# Patient Record
Sex: FEMALE | Race: WHITE | Employment: OTHER | URBAN - METROPOLITAN AREA
[De-identification: names, ages, dates, MRNs, and addresses within clinical notes are randomized per-mention and may not be internally consistent; named-entity substitution may affect disease eponyms.]

---

## 2017-02-07 ENCOUNTER — OFFICE VISIT (OUTPATIENT)
Dept: INTERNAL MEDICINE CLINIC | Age: 67
End: 2017-02-07

## 2017-02-07 ENCOUNTER — HOSPITAL ENCOUNTER (OUTPATIENT)
Dept: LAB | Age: 67
Discharge: HOME | End: 2017-02-07
Payer: MEDICARE

## 2017-02-07 VITALS
SYSTOLIC BLOOD PRESSURE: 130 MMHG | HEIGHT: 62 IN | RESPIRATION RATE: 18 BRPM | WEIGHT: 116 LBS | HEART RATE: 77 BPM | DIASTOLIC BLOOD PRESSURE: 59 MMHG | BODY MASS INDEX: 21.35 KG/M2 | TEMPERATURE: 96.3 F | OXYGEN SATURATION: 95 %

## 2017-02-07 DIAGNOSIS — Z12.11 COLON CANCER SCREENING: ICD-10-CM

## 2017-02-07 DIAGNOSIS — M54.2 CHRONIC NECK PAIN: ICD-10-CM

## 2017-02-07 DIAGNOSIS — I10 ESSENTIAL HYPERTENSION: Primary | ICD-10-CM

## 2017-02-07 DIAGNOSIS — G89.29 CHRONIC NECK PAIN: ICD-10-CM

## 2017-02-07 DIAGNOSIS — G89.29 CHRONIC PAIN OF LEFT KNEE: ICD-10-CM

## 2017-02-07 DIAGNOSIS — M25.562 CHRONIC PAIN OF LEFT KNEE: ICD-10-CM

## 2017-02-07 DIAGNOSIS — I87.2 VENOUS INSUFFICIENCY OF BOTH LOWER EXTREMITIES: ICD-10-CM

## 2017-02-07 DIAGNOSIS — Z12.39 BREAST CANCER SCREENING: ICD-10-CM

## 2017-02-07 DIAGNOSIS — Z23 ENCOUNTER FOR IMMUNIZATION: ICD-10-CM

## 2017-02-07 PROCEDURE — 80053 COMPREHEN METABOLIC PANEL: CPT

## 2017-02-07 PROCEDURE — 36415 COLL VENOUS BLD VENIPUNCTURE: CPT

## 2017-02-07 PROCEDURE — 80061 LIPID PANEL: CPT

## 2017-02-07 PROCEDURE — 85027 COMPLETE CBC AUTOMATED: CPT

## 2017-02-07 RX ORDER — HYDROCODONE BITARTRATE AND ACETAMINOPHEN 5; 325 MG/1; MG/1
1 TABLET ORAL
Qty: 30 TAB | Refills: 0 | Status: SHIPPED | OUTPATIENT
Start: 2017-02-07 | End: 2017-03-30 | Stop reason: SDUPTHER

## 2017-02-07 NOTE — PROGRESS NOTES
HISTORY OF PRESENT ILLNESS  Paolo Salvador is a 61 y.o. female. Pt. comes in for f/u. Has multiple medical problems. Reports a recent fall outside and hitting L knee and hip. Has chronic arthritic pains especially in neck and back. Norco helps. Reports some ankle swelling at end of day. No CP or SOB. Reports compliance with medications and diet. Med list and most recent labs/studies reviewed with pt. Has not done labs the last 2 times as recommended  Due to not having insurance. Trying to be active physically as tolerated. Reports chronic anxiety and depression. Meds help. No smoking. Still drinks some alcohol. Due for labs. Needs refills. Reports no other new c/o. Hypertension    Associated symptoms include neck pain. Pertinent negatives include no chest pain, no blurred vision, no headaches, no dizziness and no shortness of breath. Ankle swelling    Associated symptoms include back pain and neck pain. Knee Pain   Pertinent negatives include no chest pain, no abdominal pain, no headaches and no shortness of breath. Follow Up Chronic Condition   Pertinent negatives include no chest pain, no abdominal pain, no headaches and no shortness of breath. Review of Systems   Constitutional: Negative. Eyes: Negative for blurred vision. Respiratory: Negative for shortness of breath. Cardiovascular: Negative for chest pain and leg swelling. Gastrointestinal: Negative for abdominal pain. Genitourinary: Negative for dysuria. Musculoskeletal: Positive for back pain, joint pain and neck pain. Negative for falls. Neurological: Negative for dizziness, sensory change and headaches. Psychiatric/Behavioral: Positive for depression. The patient is nervous/anxious. The patient does not have insomnia. All other systems reviewed and are negative. Physical Exam   Constitutional: She is oriented to person, place, and time. She appears well-developed and well-nourished. No distress.    HENT:   Head: Normocephalic and atraumatic. Mouth/Throat: Oropharynx is clear and moist.   Eyes: Conjunctivae are normal. No scleral icterus. Neck: Normal range of motion. Neck supple. No JVD present. No thyromegaly present. Cardiovascular: Normal rate, regular rhythm, normal heart sounds and intact distal pulses. No murmur heard. Pulmonary/Chest: Effort normal and breath sounds normal. No respiratory distress. She has no wheezes. She has no rales. Abdominal: Soft. Bowel sounds are normal. She exhibits no distension. Musculoskeletal: She exhibits tenderness (cervicls and lumbars, L knee but no bruise/effusion). She exhibits no edema. djd   Neurological: She is alert and oriented to person, place, and time. Coordination normal.   Skin: Skin is warm and dry. No rash noted. Psychiatric: Her behavior is normal.   Chronically depressed/anxious   Nursing note and vitals reviewed. ASSESSMENT and PLAN  Rosalinda Marion was seen today for hypertension, ankle swelling, knee pain and follow up chronic condition. Diagnoses and all orders for this visit:    Essential hypertension  -     CBC W/O DIFF  -     LIPID PANEL  -     METABOLIC PANEL, COMPREHENSIVE    Venous insufficiency of both lower extremities    Chronic neck pain    Chronic pain of left knee    Encounter for immunization  -     Influenza virus vaccine (PF SYRINGE) trivalent, split, 3 years and older (ALL BRANDS)  -     Pneumococcal conjugate 13 valent, IM (PREVNAR-13)    Breast cancer screening  -     CLYDE MAMMO BI SCREENING INCL CAD; Future    Colon cancer screening  -     REFERRAL FOR COLONOSCOPY    Other orders  -     Cancel: Influenza virus vaccine (PF SYRINGE) trivalent, split, 3 years and older (ALL BRANDS)  -     HYDROcodone-acetaminophen (NORCO) 5-325 mg per tablet; Take 1 Tab by mouth nightly as needed for Pain.  Max Daily Amount: 1 Tab.  -     varicella zoster vacine live (ZOSTAVAX) 19,400 unit/0.65 mL susr injection; 1 Vial by SubCUTAneous route once for 1 dose.      Follow-up Disposition:  Return in about 6 months (around 8/7/2017).    lab results and schedule of future lab studies reviewed with patient  reviewed diet, exercise and weight control  reviewed medications and side effects in detail  F/u with other MD's as scheduled  Overall stable

## 2017-02-07 NOTE — LETTER
2/15/2017 12:18 PM 
 
Ms. Lavonne Chaudhry [address] [facility] [id number] Dear Lavonne Weaver: 
 
Please find your most recent results below. Resulted Orders CBC W/O DIFF Result Value Ref Range WBC 6.9 3.4 - 10.8 x10E3/uL  
 RBC 3.70 (L) 3.77 - 5.28 x10E6/uL HGB 12.5 11.1 - 15.9 g/dL HCT 36.8 34.0 - 46.6 %  (H) 79 - 97 fL  
 MCH 33.8 (H) 26.6 - 33.0 pg  
 MCHC 34.0 31.5 - 35.7 g/dL  
 RDW 12.8 12.3 - 15.4 % PLATELET 534 965 - 298 x10E3/uL Narrative Performed at:  [address]  [id number] : David Lockett MD, Phone:  [phone] LIPID PANEL Result Value Ref Range Cholesterol, total 236 (H) 100 - 199 mg/dL Triglyceride 74 0 - 149 mg/dL HDL Cholesterol 120 >39 mg/dL VLDL, calculated 15 5 - 40 mg/dL LDL, calculated 101 (H) 0 - 99 mg/dL Narrative Performed at:  [address]  [id number] : David Lockett MD, Phone:  [phone] METABOLIC PANEL, COMPREHENSIVE Result Value Ref Range Glucose 101 (H) 65 - 99 mg/dL BUN 18 8 - 27 mg/dL Creatinine 0.94 0.57 - 1.00 mg/dL GFR est non-AA 64 >59 mL/min/1.73 GFR est AA 74 >59 mL/min/1.73  
 BUN/Creatinine ratio 19 11 - 26 Sodium 134 134 - 144 mmol/L Potassium 4.8 3.5 - 5.2 mmol/L Chloride 92 (L) 96 - 106 mmol/L  
 CO2 20 18 - 29 mmol/L Calcium 10.4 (H) 8.7 - 10.3 mg/dL Protein, total 7.4 6.0 - 8.5 g/dL Albumin 4.9 (H) 3.6 - 4.8 g/dL GLOBULIN, TOTAL 2.5 1.5 - 4.5 g/dL A-G Ratio 2.0 1.1 - 2.5 Bilirubin, total 0.8 0.0 - 1.2 mg/dL Alk. phosphatase 83 39 - 117 IU/L  
 AST (SGOT) 33 0 - 40 IU/L  
 ALT (SGPT) 19 0 - 32 IU/L Narrative Performed at:  [address]  [id number] : David Lockett MD, Phone:  [phone] CVD REPORT Result Value Ref Range INTERPRETATION Note Comment:  
   Supplement report is available. Narrative Performed at:  [address] [place] [address]  [id number] : Keli Murray PhD, Phone:  [phone] RECOMMENDATIONS: 
 
  stable Please call me if you have any questions: [phone] Sincerely, 
 
 
Jaron Cowart, DO

## 2017-02-08 LAB
ALBUMIN SERPL-MCNC: 4.9 G/DL (ref 3.6–4.8)
ALBUMIN/GLOB SERPL: 2 {RATIO} (ref 1.1–2.5)
ALP SERPL-CCNC: 83 IU/L (ref 39–117)
ALT SERPL-CCNC: 19 IU/L (ref 0–32)
AST SERPL-CCNC: 33 IU/L (ref 0–40)
BILIRUB SERPL-MCNC: 0.8 MG/DL (ref 0–1.2)
BUN SERPL-MCNC: 18 MG/DL (ref 8–27)
BUN/CREAT SERPL: 19 (ref 11–26)
CALCIUM SERPL-MCNC: 10.4 MG/DL (ref 8.7–10.3)
CHLORIDE SERPL-SCNC: 92 MMOL/L (ref 96–106)
CHOLEST SERPL-MCNC: 236 MG/DL (ref 100–199)
CO2 SERPL-SCNC: 20 MMOL/L (ref 18–29)
CREAT SERPL-MCNC: 0.94 MG/DL (ref 0.57–1)
ERYTHROCYTE [DISTWIDTH] IN BLOOD BY AUTOMATED COUNT: 12.8 % (ref 12.3–15.4)
GLOBULIN SER CALC-MCNC: 2.5 G/DL (ref 1.5–4.5)
GLUCOSE SERPL-MCNC: 101 MG/DL (ref 65–99)
HCT VFR BLD AUTO: 36.8 % (ref 34–46.6)
HDLC SERPL-MCNC: 120 MG/DL
HGB BLD-MCNC: 12.5 G/DL (ref 11.1–15.9)
INTERPRETATION, 910389: NORMAL
LDLC SERPL CALC-MCNC: 101 MG/DL (ref 0–99)
MCH RBC QN AUTO: 33.8 PG (ref 26.6–33)
MCHC RBC AUTO-ENTMCNC: 34 G/DL (ref 31.5–35.7)
MCV RBC AUTO: 100 FL (ref 79–97)
PLATELET # BLD AUTO: 200 X10E3/UL (ref 150–379)
POTASSIUM SERPL-SCNC: 4.8 MMOL/L (ref 3.5–5.2)
PROT SERPL-MCNC: 7.4 G/DL (ref 6–8.5)
RBC # BLD AUTO: 3.7 X10E6/UL (ref 3.77–5.28)
SODIUM SERPL-SCNC: 134 MMOL/L (ref 134–144)
TRIGL SERPL-MCNC: 74 MG/DL (ref 0–149)
VLDLC SERPL CALC-MCNC: 15 MG/DL (ref 5–40)
WBC # BLD AUTO: 6.9 X10E3/UL (ref 3.4–10.8)

## 2017-03-31 RX ORDER — HYDROCODONE BITARTRATE AND ACETAMINOPHEN 5; 325 MG/1; MG/1
1 TABLET ORAL
Qty: 30 TAB | Refills: 0 | Status: SHIPPED | OUTPATIENT
Start: 2017-03-31 | End: 2017-08-08 | Stop reason: SDUPTHER

## 2017-04-07 DIAGNOSIS — Z12.39 BREAST CANCER SCREENING: ICD-10-CM

## 2017-04-07 RX ORDER — AMLODIPINE BESYLATE 5 MG/1
5 TABLET ORAL DAILY
Qty: 90 TAB | Refills: 3 | Status: SHIPPED | OUTPATIENT
Start: 2017-04-07 | End: 2018-02-14 | Stop reason: SDUPTHER

## 2017-04-07 RX ORDER — CLONIDINE HYDROCHLORIDE 0.1 MG/1
0.1 TABLET ORAL 2 TIMES DAILY
Qty: 180 TAB | Refills: 3 | Status: SHIPPED | OUTPATIENT
Start: 2017-04-07 | End: 2018-02-14 | Stop reason: SDUPTHER

## 2017-04-07 RX ORDER — METOPROLOL TARTRATE 50 MG/1
50 TABLET ORAL 2 TIMES DAILY
Qty: 180 TAB | Refills: 3 | Status: SHIPPED | OUTPATIENT
Start: 2017-04-07 | End: 2018-02-14 | Stop reason: SDUPTHER

## 2017-04-18 ENCOUNTER — HOSPITAL ENCOUNTER (OUTPATIENT)
Dept: GENERAL RADIOLOGY | Age: 67
Discharge: HOME | End: 2017-04-18
Payer: MEDICARE
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
## 2017-04-18 PROCEDURE — 73502 X-RAY EXAM HIP UNI 2-3 VIEWS: CPT

## 2017-04-18 PROCEDURE — 73564 X-RAY EXAM KNEE 4 OR MORE: CPT

## 2017-07-20 ENCOUNTER — TELEPHONE (OUTPATIENT)
Dept: INTERNAL MEDICINE CLINIC | Age: 67
End: 2017-07-20

## 2017-07-20 DIAGNOSIS — M21.619 BUNION: Primary | ICD-10-CM

## 2017-07-20 DIAGNOSIS — M20.42 HAMMER TOE OF LEFT FOOT: Primary | ICD-10-CM

## 2017-07-20 DIAGNOSIS — M21.612 BUNION, LEFT FOOT: ICD-10-CM

## 2017-07-20 NOTE — TELEPHONE ENCOUNTER
Wants a referral to podiatrist Dr. Nahid Lawson. Appointment date is Aug 3. Phone [phone], fax is [phone]. Hammer toe, bunion.  If a referral is written, get with Maranda Arroyo to do the insurance referral.

## 2017-07-20 NOTE — TELEPHONE ENCOUNTER
Spoke to patient she reports hammer toe left foot and bunion, left foot.  patiendt is requesting referral to Podiatrist.

## 2017-08-08 ENCOUNTER — OFFICE VISIT (OUTPATIENT)
Dept: INTERNAL MEDICINE CLINIC | Age: 67
End: 2017-08-08

## 2017-08-08 VITALS
TEMPERATURE: 96.2 F | BODY MASS INDEX: 21.16 KG/M2 | WEIGHT: 115 LBS | HEIGHT: 62 IN | HEART RATE: 65 BPM | SYSTOLIC BLOOD PRESSURE: 143 MMHG | DIASTOLIC BLOOD PRESSURE: 75 MMHG | OXYGEN SATURATION: 100 % | RESPIRATION RATE: 18 BRPM

## 2017-08-08 DIAGNOSIS — Z13.39 SCREENING FOR ALCOHOLISM: ICD-10-CM

## 2017-08-08 DIAGNOSIS — M79.672 LEFT FOOT PAIN: ICD-10-CM

## 2017-08-08 DIAGNOSIS — Z13.31 SCREENING FOR DEPRESSION: ICD-10-CM

## 2017-08-08 DIAGNOSIS — M54.2 CHRONIC NECK PAIN: ICD-10-CM

## 2017-08-08 DIAGNOSIS — M54.42 CHRONIC LEFT-SIDED LOW BACK PAIN WITH LEFT-SIDED SCIATICA: ICD-10-CM

## 2017-08-08 DIAGNOSIS — Z00.00 WELCOME TO MEDICARE PREVENTIVE VISIT: Primary | ICD-10-CM

## 2017-08-08 DIAGNOSIS — G89.29 CHRONIC LEFT-SIDED LOW BACK PAIN WITH LEFT-SIDED SCIATICA: ICD-10-CM

## 2017-08-08 DIAGNOSIS — Z12.31 ENCOUNTER FOR SCREENING MAMMOGRAM FOR MALIGNANT NEOPLASM OF BREAST: ICD-10-CM

## 2017-08-08 DIAGNOSIS — Z78.0 POSTMENOPAUSAL: ICD-10-CM

## 2017-08-08 DIAGNOSIS — I10 ESSENTIAL HYPERTENSION: ICD-10-CM

## 2017-08-08 DIAGNOSIS — Z00.00 ROUTINE GENERAL MEDICAL EXAMINATION AT A HEALTH CARE FACILITY: ICD-10-CM

## 2017-08-08 DIAGNOSIS — L98.9 SKIN LESION OF BACK: ICD-10-CM

## 2017-08-08 DIAGNOSIS — G89.29 CHRONIC NECK PAIN: ICD-10-CM

## 2017-08-08 RX ORDER — HYDROCODONE BITARTRATE AND ACETAMINOPHEN 5; 325 MG/1; MG/1
1 TABLET ORAL
Qty: 30 TAB | Refills: 0 | Status: SHIPPED | OUTPATIENT
Start: 2017-08-08 | End: 2017-08-08 | Stop reason: SDUPTHER

## 2017-08-08 RX ORDER — HYDROCODONE BITARTRATE AND ACETAMINOPHEN 5; 325 MG/1; MG/1
1 TABLET ORAL
Qty: 30 TAB | Refills: 0 | Status: SHIPPED | OUTPATIENT
Start: 2017-08-08 | End: 2018-02-13 | Stop reason: SDUPTHER

## 2017-08-08 NOTE — PROGRESS NOTES
History     Past Medical History:   Diagnosis Date    Arthritis     back problems    Depression     Family history of skin cancer     father Lloyd Alva from finger    Hypertension 2015    Tachycardia 2015    Tanning bed exposure     past      Past Surgical History:   Procedure Laterality Date    HX BUNIONECTOMY      HX GYN      HX MALIGNANT SKIN LESION EXCISION      basal cell carcinoma     Current Outpatient Prescriptions   Medication Sig Dispense Refill    amLODIPine (NORVASC) 5 mg tablet Take 1 Tab by mouth daily. 90 Tab 3    cloNIDine HCl (CATAPRES) 0.1 mg tablet Take 1 Tab by mouth two (2) times a day. 180 Tab 3    metoprolol tartrate (LOPRESSOR) 50 mg tablet Take 1 Tab by mouth two (2) times a day. 180 Tab 3    HYDROcodone-acetaminophen (NORCO) 5-325 mg per tablet Take 1 Tab by mouth nightly as needed for Pain. Max Daily Amount: 1 Tab. 30 Tab 0    ibuprofen 100 mg tablet Take 100 mg by mouth every six (6) hours as needed for Pain.  celecoxib (CELEBREX) 200 mg capsule Take  by mouth two (2) times a day.  methocarbamol (ROBAXIN) 750 mg tablet Take  by mouth three (3) times daily as needed.  Cetirizine 10 mg cap Take  by mouth.        Allergies   Allergen Reactions    Erythromycin Other (comments)     Intestinal problems       Family History   Problem Relation Age of Onset    Bleeding Prob Father     Cancer Father      melanoma    Bleeding Prob Brother      Social History   Substance Use Topics    Smoking status: Never Smoker    Smokeless tobacco: Never Used    Alcohol use 0.0 oz/week     7 - 10 Glasses of wine per week      Comment: 3 or 4 drinks white wine per week     Diet, Lifestyle: generally follows a low sodium diet    Exercise level: moderately active    Depression Risk Screen     PHQ over the last two weeks 8/8/2017   Little interest or pleasure in doing things Not at all   Feeling down, depressed or hopeless Not at all   Total Score PHQ 2 0     Alcohol Risk Screen On any occasion during the past 3 months, have you had more than 3 drinks containing alcohol? No    Do you average more than 7 drinks per week? No      Functional Ability and Level of Safety     Hearing Loss   none    Activities of Daily Living   Self-care     Fall Risk Screen   Fall Risk Assessment, last 12 mths 8/8/2017   Able to walk? Yes   Fall in past 12 months? Yes   Fall with injury? No   Number of falls in past 12 months 1   Fall Risk Score 1     Abuse Screen   None  Patient is not abused    Review of Systems   Pertinent items are noted in HPI. Physical Examination     No exam data present     Visit Vitals    /75 (BP 1 Location: Right arm, BP Patient Position: Sitting)    Pulse 65    Temp 96.2 °F (35.7 °C) (Oral)    Resp 18    Ht 5' 1.5\" (1.562 m)    Wt 115 lb (52.2 kg)    SpO2 100%    BMI 21.38 kg/m2     General:  Alert, cooperative, no distress, appears stated age. Head:  Normocephalic, without obvious abnormality, atraumatic. Eyes:  Conjunctivae/corneas clear. PERRL, EOMs intact. Fundi benign. Ears:  Normal TMs and external ear canals both ears. Nose: Nares normal. Septum midline. Mucosa normal. No drainage or sinus tenderness. Throat: Lips, mucosa, and tongue normal. Teeth and gums normal.   Neck: Supple, symmetrical, trachea midline, no adenopathy, thyroid: no enlargement/tenderness/nodules, no carotid bruit and no JVD. Back:   Symmetric, no curvature. ROM normal. No CVA tenderness. Lungs:   Clear to auscultation bilaterally. Chest wall:  No tenderness or deformity. Heart:  Regular rate and rhythm, S1, S2 normal, no murmur, click, rub or gallop. Breast Exam:  No tenderness, masses, or nipple abnormality. Abdomen:   Soft, non-tender. Bowel sounds normal. No masses,  No organomegaly. Genitalia:  Normal female without lesion, discharge or tenderness. Rectal:  Normal tone,  no masses or tenderness  Guaiac negative stool.    Extremities: Extremities normal, atraumatic, no cyanosis or edema. Pulses: 2+ and symmetric all extremities. Skin: Skin color, texture, turgor normal. No rashes or lesions. Lymph nodes: Cervical, supraclavicular, and axillary nodes normal.   Neurologic: CNII-XII intact. Normal strength, sensation and reflexes throughout. EKG Screening: normal EKG, normal sinus rhythm, unchanged from previous tracings. Patient Care Team:  Ashish Reich DO as PCP - [address] [place] [address] Dr Ramos, NP as Nurse Practitioner (Nurse Practitioner)     End-of-life planning  Advanced Directive discussed and documented: YES      Assessment/Plan   Education and counseling provided:  Are appropriate based on today's review and evaluation    Follow-up Disposition: Not on File.

## 2017-08-08 NOTE — PROGRESS NOTES
HISTORY OF PRESENT ILLNESS  Jacinta Webb is a 74 y.o. female. Pt. comes in for f/u as well as welcome to Medicare visit. Has multiple medical problems. Has chronic arthritic pains especially in neck and back. Takes Celebrex twice daily. Has been taking extra ibuprofen as well despite my recommendation not to. PRN Norco helps. Also followed by orthopedic MD. Reports a chronic lesion on left upper back. Has history of skin cancer. Has appointment with dermatologist coming up. Reports compliance with medications and diet. Med list and most recent labs/studies reviewed with pt. They all looks stable. Followed by podiatry for left foot pain with bunion and joey. Plans for surgery. Supposed to get preop labs. Her depression anxiety is stable with medications. Trying to be active physically as tolerated. No smoking. Still drinks 5-92 alcoholic drinks weekly. Needs refills. Reports no other new c/o. Blood Pressure Check   Pertinent negatives include no chest pain, no abdominal pain, no headaches and no shortness of breath. Foot Pain   Pertinent negatives include no chest pain, no abdominal pain, no headaches and no shortness of breath. Welcome To Medicare   Pertinent negatives include no chest pain, no abdominal pain, no headaches and no shortness of breath. Skin Problem   Pertinent negatives include no chest pain, no abdominal pain, no headaches and no shortness of breath. Review of Systems   Constitutional: Negative. Eyes: Negative for blurred vision. Respiratory: Negative for shortness of breath. Cardiovascular: Negative for chest pain and leg swelling. Gastrointestinal: Negative for abdominal pain. Genitourinary: Negative for dysuria. Musculoskeletal: Positive for back pain, joint pain and neck pain. Negative for falls. Neurological: Negative for dizziness, sensory change and headaches. Psychiatric/Behavioral: Positive for depression.  The patient is nervous/anxious. The patient does not have insomnia. All other systems reviewed and are negative. Physical Exam   Constitutional: She is oriented to person, place, and time. She appears well-developed and well-nourished. No distress. HENT:   Head: Normocephalic and atraumatic. Mouth/Throat: Oropharynx is clear and moist.   Eyes: Conjunctivae are normal. No scleral icterus. Neck: Normal range of motion. Neck supple. No JVD present. No thyromegaly present. Cardiovascular: Normal rate, regular rhythm, normal heart sounds and intact distal pulses. No murmur heard. Pulmonary/Chest: Effort normal and breath sounds normal. No respiratory distress. She has no wheezes. She has no rales. Abdominal: Soft. Bowel sounds are normal. She exhibits no distension. Musculoskeletal: She exhibits tenderness (cervicls and lumbar, chronic). She exhibits no edema. djd  Left bunion and hammertoes   Neurological: She is alert and oriented to person, place, and time. Coordination normal.   Skin: Skin is warm and dry. No rash noted. Round raised lesion on left trapezius muscle suspicious for skin cancer   Psychiatric: Her behavior is normal.   Chronically depressed/anxious   Nursing note and vitals reviewed. ASSESSMENT and PLAN  Diagnoses and all orders for this visit:    1. Welcome to Medicare preventive visit  -     AMB POC EKG ROUTINE W/ 12 LEADS, INTER & REP    2. Routine general medical examination at a health care facility  -     AMB POC EKG ROUTINE W/ 12 LEADS, INTER & REP    3. Screening for alcoholism    4. Screening for depression  -     Depression Screen Annual    5. Encounter for screening mammogram for malignant neoplasm of breast  -     Bilateral Digital Screening Mammography; Future    6. Postmenopausal  -     Dexa Bone Density Study Axial ([id number]); Future    7. Essential hypertension    8. Skin lesion of back    9. Left foot pain    10. Chronic neck pain    11.  Chronic left-sided low back pain with left-sided sciatica    Other orders  -     HYDROcodone-acetaminophen (NORCO) 5-325 mg per tablet; Take 1 Tab by mouth nightly as needed for Pain. Max Daily Amount: 1 Tab.  -     varicella zoster vacine live (ZOSTAVAX) 19,400 unit/0.65 mL susr injection; 1 Vial by SubCUTAneous route once for 1 dose. Follow-up Disposition:  Return in about 6 months (around 2/8/2018).    lab results and schedule of future lab studies reviewed with patient  reviewed diet, exercise and weight control  reviewed medications and side effects in detail  F/u with other MD's as scheduled  Overall stable

## 2017-08-08 NOTE — MR AVS SNAPSHOT
Visit Information Date & Time Provider Department Dept. Phone Encounter #  
 8/8/2017 11:00 AM [facility], [facility] Internal Medicine [phone] [id number] Follow-up Instructions Return in about 6 months (around 2/8/2018). Upcoming Health Maintenance Date Due Hepatitis C Screening 1950 COLONOSCOPY 2/25/1970 BREAST CANCER SCRN MAMMOGRAM 2/25/2002 ZOSTER VACCINE AGE 60> 12/25/2011 DTaP/Tdap/Td series (1 - Tdap) 2/3/2013 GLAUCOMA SCREENING Q2Y 2/25/2017 OSTEOPOROSIS SCREENING (DEXA) 2/25/2017 MEDICARE YEARLY EXAM 2/25/2017 INFLUENZA AGE 9 TO ADULT 8/1/2017 Pneumococcal 65+ Low/Medium Risk (2 of 2 - PPSV23) 2/2/2021 Allergies as of 8/8/2017  Review Complete On: 8/8/2017 By: [facility] DO Severity Noted Reaction Type Reactions Erythromycin  05/20/2014    Other (comments) Intestinal problems Current Immunizations  Reviewed on 2/7/2017 Name Date Influenza Vaccine 12/5/2014 Influenza Vaccine Intradermal PF 2/2/2016 Influenza Vaccine PF 2/7/2017 Pneumococcal Conjugate (PCV-13) 2/7/2017 Pneumococcal Polysaccharide (PPSV-23) 2/2/2016 Td 2/2/2013 Not reviewed this visit You Were Diagnosed With   
  
 Codes Comments Welcome to Medicare preventive visit    -  Primary ICD-10-CM: Z00.00 ICD-9-CM: V70.0 Routine general medical examination at a health care facility     ICD-10-CM: Z00.00 ICD-9-CM: V70.0 Screening for alcoholism     ICD-10-CM: Z13.89 ICD-9-CM: V79.1 Screening for depression     ICD-10-CM: Z13.89 ICD-9-CM: V79.0 Encounter for screening mammogram for malignant neoplasm of breast     ICD-10-CM: Z12.31 
ICD-9-CM: V76.12 Postmenopausal     ICD-10-CM: Z78.0 ICD-9-CM: V49.81 Essential hypertension     ICD-10-CM: I10 
ICD-9-CM: 401.9 Skin lesion of back     ICD-10-CM: L98.9 ICD-9-CM: 709.9 Left foot pain     ICD-10-CM: U05.503 ICD-9-CM: 729.5 Chronic neck pain     ICD-10-CM: M54.2, G89.29 ICD-9-CM: 723.1, 338.29 Chronic left-sided low back pain with left-sided sciatica     ICD-10-CM: M54.42, G89.29 ICD-9-CM: 724.2, 724.3, 338.29 Vitals BP Pulse Temp Resp Height(growth percentile) Weight(growth percentile) 143/75 (BP 1 Location: Right arm, BP Patient Position: Sitting) 65 96.2 °F (35.7 °C) (Oral) 18 5' 1.5\" (1.562 m) 115 lb (52.2 kg) SpO2 BMI OB Status Smoking Status 100% 21.38 kg/m2 Postmenopausal Never Smoker Vitals History BMI and BSA Data Body Mass Index Body Surface Area  
 21.38 kg/m 2 1.51 m 2 [facility] [phone] Your Updated Medication List  
  
   
This list is accurate as of: 8/8/17 12:05 PM.  Always use your most recent med list. amLODIPine 5 mg tablet Commonly known as:  Nathanael Ape Take 1 Tab by mouth daily. CeleBREX 200 mg capsule Generic drug:  celecoxib Take  by mouth two (2) times a day. Cetirizine 10 mg Cap Take  by mouth.  
  
 cloNIDine HCl 0.1 mg tablet Commonly known as:  CATAPRES Take 1 Tab by mouth two (2) times a day. HYDROcodone-acetaminophen 5-325 mg per tablet Commonly known as:  [place] Ilia Take 1 Tab by mouth nightly as needed for Pain. Max Daily Amount: 1 Tab.  
  
 ibuprofen 100 mg tablet Take 100 mg by mouth every six (6) hours as needed for Pain. methocarbamol 750 mg tablet Commonly known as:  ROBAXIN Take  by mouth three (3) times daily as needed. metoprolol tartrate 50 mg tablet Commonly known as:  LOPRESSOR Take 1 Tab by mouth two (2) times a day. varicella zoster vacine live 19,400 unit/0.65 mL Susr injection Commonly known as:  ZOSTAVAX  
1 Vial by SubCUTAneous route once for 1 dose. Prescriptions Printed Refills HYDROcodone-acetaminophen (NORCO) 5-325 mg per tablet 0 Sig: Take 1 Tab by mouth nightly as needed for Pain. Max Daily Amount: 1 Tab. Class: Print Route: Oral  
 varicella zoster vacine live (ZOSTAVAX) 19,400 unit/0.65 mL susr injection 0 Si Vial by SubCUTAneous route once for 1 dose. Class: Print Route: SubCUTAneous We Performed the Following AMB POC EKG ROUTINE W/ 12 LEADS, INTER & REP [42919 CPT(R)] [address] [[id number] [facility]] Follow-up Instructions Return in about 6 months (around 2018). To-Do List   
 2017 Imaging:  DEXA BONE DENSITY STUDY AXIAL   
  
 2017 Imaging:  CLYDE MAMMO BI SCREENING INCL CAD Introducing [facility]! Nadja Fofana introduces [employer] patient portal. Now you can access parts of your medical record, email your doctor's office, and request medication refills online. 1. In your internet browser, go to [email or web address] 2. Click on the First Time User? Click Here link in the Sign In box. You will see the New Member Sign Up page. 3. Enter your [employer] Access Code exactly as it appears below. You will not need to use this code after youve completed the sign-up process. If you do not sign up before the expiration date, you must request a new code. · [employer] Access Code: [email or web address] Expires: 2017 11:21 AM 
 
4. Enter the last four digits of your Social Security Number (xxxx) and Date of Birth (mm/dd/yyyy) as indicated and click Submit. You will be taken to the next sign-up page. 5. Create a [employer] ID. This will be your [employer] login ID and cannot be changed, so think of one that is secure and easy to remember. 6. Create a [employer] password. You can change your password at any time. 7. Enter your Password Reset Question and Answer. This can be used at a later time if you forget your password. 8. Enter your e-mail address. You will receive e-mail notification when new information is available in [address] 9. Click Sign Up. You can now view and download portions of your medical record. 10. Click the Download Summary menu link to download a portable copy of your medical information. If you have questions, please visit the Frequently Asked Questions section of the [employer] website. Remember, [employer] is NOT to be used for urgent needs. For medical emergencies, dial 911. Now available from your iPhone and Android! Please provide this summary of care documentation to your next provider. Your primary care clinician is listed as Jacqlyn Areas. If you have any questions after today's visit, please call [phone].

## 2017-08-08 NOTE — PROGRESS NOTES
Chief Complaint   Patient presents with    Blood Pressure Check    Foot Pain     l, SEES DPM, plans for surgery    Annual Wellness Visit    Skin Problem     L upper back lesion, , [address] Maintenance Due   Topic Date Due    Hepatitis C Screening  1950    COLONOSCOPY  02/25/1970    BREAST CANCER SCRN MAMMOGRAM  02/25/2002    ZOSTER VACCINE AGE 60>  12/25/2011    DTaP/Tdap/Td series (1 - Tdap) 02/03/2013    GLAUCOMA SCREENING Q2Y  02/25/2017    OSTEOPOROSIS SCREENING (DEXA)  02/25/2017    MEDICARE YEARLY EXAM  02/25/2017    INFLUENZA AGE 9 TO ADULT  08/01/2017     Coordination of Care Questions    1. Have you been to the ER, urgent care clinic since your last visit? No       Hospitalized since your last visit? No    2. Have you seen or consulted any other health care providers outside of the [facility] since your last visit? Include any pap smears or colon screening. No    PHQ over the last two weeks 8/8/2017   Little interest or pleasure in doing things Not at all   Feeling down, depressed or hopeless Not at all   Total Score PHQ 2 0     Fall Risk Assessment, last 12 mths 8/8/2017   Able to walk? Yes   Fall in past 12 months? Yes   Fall with injury?  No   Number of falls in past 12 months 1   Fall Risk Score 1       ADL Assessment 8/8/2017   Feeding yourself No Help Needed   Getting from bed to chair No Help Needed   Getting dressed No Help Needed   Bathing or showering No Help Needed   Walk across the room (includes cane/walker) No Help Needed   Using the telphone No Help Needed   Taking your medications No Help Needed   Preparing meals No Help Needed   Managing money (expenses/bills) No Help Needed   Moderately strenuous housework (laundry) No Help Needed   Shopping for personal items (toiletries/medicines) No Help Needed   Shopping for groceries No Help Needed   Driving No Help Needed   Climbing a flight of stairs No Help Needed   Getting to places beyond walking distances No Help Needed

## 2017-11-08 DIAGNOSIS — G89.29 CHRONIC NECK PAIN: Primary | ICD-10-CM

## 2017-11-08 DIAGNOSIS — M54.2 CHRONIC NECK PAIN: Primary | ICD-10-CM

## 2017-11-08 DIAGNOSIS — G89.29 CHRONIC LEFT-SIDED LOW BACK PAIN WITH LEFT-SIDED SCIATICA: ICD-10-CM

## 2017-11-08 DIAGNOSIS — M54.42 CHRONIC LEFT-SIDED LOW BACK PAIN WITH LEFT-SIDED SCIATICA: ICD-10-CM

## 2017-11-08 RX ORDER — HYDROCODONE BITARTRATE AND ACETAMINOPHEN 5; 325 MG/1; MG/1
1 TABLET ORAL
Qty: 30 TAB | Refills: 0 | Status: CANCELLED | OUTPATIENT
Start: 2017-11-08

## 2018-01-01 ENCOUNTER — OFFICE VISIT (OUTPATIENT)
Dept: INTERNAL MEDICINE CLINIC | Age: 68
End: 2018-01-01

## 2018-01-01 ENCOUNTER — HOSPITAL ENCOUNTER (OUTPATIENT)
Dept: LAB | Age: 68
Discharge: HOME | End: 2018-08-28
Payer: MEDICARE

## 2018-01-01 ENCOUNTER — DOCUMENTATION ONLY (OUTPATIENT)
Dept: INTERNAL MEDICINE CLINIC | Age: 68
End: 2018-01-01

## 2018-01-01 ENCOUNTER — TELEPHONE (OUTPATIENT)
Dept: INTERNAL MEDICINE CLINIC | Age: 68
End: 2018-01-01

## 2018-01-01 VITALS
WEIGHT: 108 LBS | RESPIRATION RATE: 18 BRPM | DIASTOLIC BLOOD PRESSURE: 65 MMHG | BODY MASS INDEX: 20.39 KG/M2 | OXYGEN SATURATION: 98 % | TEMPERATURE: 97.8 F | HEIGHT: 61 IN | HEART RATE: 62 BPM | SYSTOLIC BLOOD PRESSURE: 124 MMHG

## 2018-01-01 DIAGNOSIS — Z12.31 ENCOUNTER FOR SCREENING MAMMOGRAM FOR BREAST CANCER: ICD-10-CM

## 2018-01-01 DIAGNOSIS — Z71.89 ACP (ADVANCE CARE PLANNING): ICD-10-CM

## 2018-01-01 DIAGNOSIS — I10 ESSENTIAL HYPERTENSION: Primary | ICD-10-CM

## 2018-01-01 DIAGNOSIS — G89.29 CHRONIC LEFT-SIDED LOW BACK PAIN WITH LEFT-SIDED SCIATICA: ICD-10-CM

## 2018-01-01 DIAGNOSIS — G89.29 CHRONIC PAIN OF LEFT KNEE: ICD-10-CM

## 2018-01-01 DIAGNOSIS — Z23 ENCOUNTER FOR IMMUNIZATION: ICD-10-CM

## 2018-01-01 DIAGNOSIS — M25.562 CHRONIC PAIN OF LEFT KNEE: ICD-10-CM

## 2018-01-01 DIAGNOSIS — M54.42 CHRONIC LEFT-SIDED LOW BACK PAIN WITH LEFT-SIDED SCIATICA: ICD-10-CM

## 2018-01-01 DIAGNOSIS — E78.00 HYPERCHOLESTEROLEMIA: ICD-10-CM

## 2018-01-01 DIAGNOSIS — G89.29 CHRONIC NECK PAIN: ICD-10-CM

## 2018-01-01 DIAGNOSIS — M54.2 CHRONIC NECK PAIN: ICD-10-CM

## 2018-01-01 DIAGNOSIS — F33.41 RECURRENT MAJOR DEPRESSIVE DISORDER, IN PARTIAL REMISSION (HCC): ICD-10-CM

## 2018-01-01 DIAGNOSIS — Z00.00 MEDICARE ANNUAL WELLNESS VISIT, SUBSEQUENT: ICD-10-CM

## 2018-01-01 LAB
ALBUMIN SERPL-MCNC: 4.8 G/DL (ref 3.6–4.8)
ALBUMIN/GLOB SERPL: 1.9 {RATIO} (ref 1.2–2.2)
ALP SERPL-CCNC: 85 IU/L (ref 39–117)
ALT SERPL-CCNC: 35 IU/L (ref 0–32)
AST SERPL-CCNC: 66 IU/L (ref 0–40)
BILIRUB SERPL-MCNC: 1 MG/DL (ref 0–1.2)
BUN SERPL-MCNC: 11 MG/DL (ref 8–27)
BUN/CREAT SERPL: 12 (ref 12–28)
CALCIUM SERPL-MCNC: 10.2 MG/DL (ref 8.7–10.3)
CHLORIDE SERPL-SCNC: 90 MMOL/L (ref 96–106)
CHOLEST SERPL-MCNC: 222 MG/DL (ref 100–199)
CO2 SERPL-SCNC: 16 MMOL/L (ref 20–29)
CREAT SERPL-MCNC: 0.89 MG/DL (ref 0.57–1)
ERYTHROCYTE [DISTWIDTH] IN BLOOD BY AUTOMATED COUNT: 12.9 % (ref 12.3–15.4)
GLOBULIN SER CALC-MCNC: 2.5 G/DL (ref 1.5–4.5)
GLUCOSE SERPL-MCNC: 94 MG/DL (ref 65–99)
HCT VFR BLD AUTO: 36.5 % (ref 34–46.6)
HDLC SERPL-MCNC: 148 MG/DL
HGB BLD-MCNC: 11.9 G/DL (ref 11.1–15.9)
INTERPRETATION, 910389: NORMAL
LDLC SERPL CALC-MCNC: 60 MG/DL (ref 0–99)
MCH RBC QN AUTO: 34.8 PG (ref 26.6–33)
MCHC RBC AUTO-ENTMCNC: 32.6 G/DL (ref 31.5–35.7)
MCV RBC AUTO: 107 FL (ref 79–97)
PLATELET # BLD AUTO: 203 X10E3/UL (ref 150–379)
POTASSIUM SERPL-SCNC: 4.7 MMOL/L (ref 3.5–5.2)
PROT SERPL-MCNC: 7.3 G/DL (ref 6–8.5)
RBC # BLD AUTO: 3.42 X10E6/UL (ref 3.77–5.28)
SODIUM SERPL-SCNC: 130 MMOL/L (ref 134–144)
TRIGL SERPL-MCNC: 68 MG/DL (ref 0–149)
VLDLC SERPL CALC-MCNC: 14 MG/DL (ref 5–40)
WBC # BLD AUTO: 6.6 X10E3/UL (ref 3.4–10.8)

## 2018-01-01 PROCEDURE — 80053 COMPREHEN METABOLIC PANEL: CPT

## 2018-01-01 PROCEDURE — 80061 LIPID PANEL: CPT

## 2018-01-01 PROCEDURE — 85027 COMPLETE CBC AUTOMATED: CPT

## 2018-01-01 PROCEDURE — 36415 COLL VENOUS BLD VENIPUNCTURE: CPT

## 2018-01-01 RX ORDER — HYDROCODONE BITARTRATE AND ACETAMINOPHEN 5; 325 MG/1; MG/1
1 TABLET ORAL
Qty: 30 TAB | Refills: 0 | Status: SHIPPED | OUTPATIENT
Start: 2018-01-01 | End: 2019-01-01 | Stop reason: SDUPTHER

## 2018-02-13 ENCOUNTER — OFFICE VISIT (OUTPATIENT)
Dept: INTERNAL MEDICINE CLINIC | Age: 68
End: 2018-02-13

## 2018-02-13 VITALS
HEART RATE: 64 BPM | DIASTOLIC BLOOD PRESSURE: 76 MMHG | BODY MASS INDEX: 20.58 KG/M2 | HEIGHT: 61 IN | WEIGHT: 109 LBS | RESPIRATION RATE: 19 BRPM | SYSTOLIC BLOOD PRESSURE: 140 MMHG | OXYGEN SATURATION: 100 %

## 2018-02-13 DIAGNOSIS — R53.83 FATIGUE, UNSPECIFIED TYPE: ICD-10-CM

## 2018-02-13 DIAGNOSIS — M54.42 CHRONIC LEFT-SIDED LOW BACK PAIN WITH LEFT-SIDED SCIATICA: ICD-10-CM

## 2018-02-13 DIAGNOSIS — Z98.890 S/P FOOT SURGERY, LEFT: ICD-10-CM

## 2018-02-13 DIAGNOSIS — M54.2 CHRONIC NECK PAIN: ICD-10-CM

## 2018-02-13 DIAGNOSIS — G89.29 CHRONIC LEFT-SIDED LOW BACK PAIN WITH LEFT-SIDED SCIATICA: ICD-10-CM

## 2018-02-13 DIAGNOSIS — G89.29 CHRONIC NECK PAIN: ICD-10-CM

## 2018-02-13 DIAGNOSIS — Z12.39 BREAST CANCER SCREENING: ICD-10-CM

## 2018-02-13 DIAGNOSIS — M81.0 AGE RELATED OSTEOPOROSIS, UNSPECIFIED PATHOLOGICAL FRACTURE PRESENCE: ICD-10-CM

## 2018-02-13 DIAGNOSIS — I10 ESSENTIAL HYPERTENSION: Primary | ICD-10-CM

## 2018-02-13 DIAGNOSIS — I87.2 VENOUS INSUFFICIENCY OF BOTH LOWER EXTREMITIES: ICD-10-CM

## 2018-02-13 RX ORDER — HYDROCODONE BITARTRATE AND ACETAMINOPHEN 5; 325 MG/1; MG/1
1 TABLET ORAL
Qty: 30 TAB | Refills: 0 | Status: SHIPPED | OUTPATIENT
Start: 2018-02-13 | End: 2018-01-01 | Stop reason: SDUPTHER

## 2018-02-13 NOTE — PROGRESS NOTES
HISTORY OF PRESENT ILLNESS  Greyson Duval is a 74 y.o. female. Pt. comes in for f/u. Has multiple medical problems. BP is stable. Continues to have chronic neck and back pain. Takes Norco occasionally. Reports left foot surgery 4 months ago. Still having some issues. Followed by podiatrist.  Zakiyabushra Beatty to have 1 or 2 drinks daily. Denies smoking. Reports compliance with medications and diet. Med list and most recent labs/studies reviewed with pt. Trying to be active physically to control weight. Needs med refills. Due for mammogram and DEXA scan. Vaccinations are up-to-date. Reports no other new c/o. Foot Pain   Pertinent negatives include no chest pain, no abdominal pain, no headaches and no shortness of breath. Abdominal Pressure    Associated symptoms include back pain. Pertinent negatives include no dysuria, no headaches and no chest pain. Fatigue   Pertinent negatives include no chest pain, no abdominal pain, no headaches and no shortness of breath. Hypertension    Associated symptoms include neck pain. Pertinent negatives include no chest pain, no headaches, no dizziness and no shortness of breath. Follow Up Chronic Condition   Pertinent negatives include no chest pain, no abdominal pain, no headaches and no shortness of breath. Review of Systems   Constitutional: Positive for fatigue. HENT: Negative. Eyes: Negative. Respiratory: Negative for shortness of breath. Cardiovascular: Negative for chest pain and leg swelling. Gastrointestinal: Positive for bloating. Negative for abdominal pain. Genitourinary: Negative for dysuria. Musculoskeletal: Positive for back pain, joint pain and neck pain. Negative for falls. Skin: Negative. Neurological: Negative for dizziness, sensory change, focal weakness and headaches. Psychiatric/Behavioral: Positive for depression. The patient is nervous/anxious. The patient does not have insomnia.     All other systems reviewed and are negative. Physical Exam   Constitutional: She is oriented to person, place, and time. She appears well-developed and well-nourished. No distress. HENT:   Head: Normocephalic and atraumatic. Mouth/Throat: Oropharynx is clear and moist.   Eyes: Conjunctivae are normal.   Neck: Normal range of motion. Neck supple. No JVD present. No thyromegaly present. Cardiovascular: Normal rate, regular rhythm, normal heart sounds and intact distal pulses. No murmur heard. Pulmonary/Chest: Effort normal and breath sounds normal. No respiratory distress. She has no wheezes. She has no rales. Abdominal: Soft. Bowel sounds are normal. She exhibits no distension. Musculoskeletal: She exhibits tenderness (cervicls and lumbars, chronic). She exhibits no edema. djd  Left foot is in an orthopedic shoe   Neurological: She is alert and oriented to person, place, and time. Coordination normal.   Skin: Skin is warm and dry. No rash noted. Psychiatric: Her behavior is normal.   Chronically depressed/anxious   Nursing note and vitals reviewed. ASSESSMENT and PLAN  Diagnoses and all orders for this visit:    1. Essential hypertension    2. Chronic neck pain  -     HYDROcodone-acetaminophen (NORCO) 5-325 mg per tablet; Take 1 Tab by mouth nightly as needed for Pain. Max Daily Amount: 1 Tab. 3. Chronic left-sided low back pain with left-sided sciatica  -     HYDROcodone-acetaminophen (NORCO) 5-325 mg per tablet; Take 1 Tab by mouth nightly as needed for Pain. Max Daily Amount: 1 Tab. 4. Venous insufficiency of both lower extremities    5. Fatigue, unspecified type    6. S/P foot surgery, left    7. Breast cancer screening  -     [facility] MAMMO BI SCREENING INCL CAD; Future    8. Age related osteoporosis, unspecified pathological fracture presence  -     DEXA BONE DENSITY STUDY AXIAL; Future      Follow-up Disposition:  Return in about 6 months (around 8/13/2018).    lab results and schedule of future lab studies reviewed with patient  reviewed diet, exercise and weight control  reviewed medications and side effects in detail  F/u with other MD's as scheduled  Overall stable

## 2018-02-13 NOTE — PROGRESS NOTES
Health Maintenance Due   Topic Date Due    Hepatitis C Screening  1950    COLONOSCOPY  02/25/1970    BREAST CANCER SCRN MAMMOGRAM  02/25/2002    ZOSTER VACCINE AGE 60>  12/25/2011    DTaP/Tdap/Td series (1 - Tdap) 02/03/2013    GLAUCOMA SCREENING Q2Y  02/25/2017    OSTEOPOROSIS SCREENING (DEXA)  02/25/2017       Chief Complaint   Patient presents with    Foot Pain     left    Abdominal Pressure    Fatigue    Follow Up Chronic Condition       1. Have you been to the ER, urgent care clinic since your last visit? Hospitalized since your last visit? No    2. Have you seen or consulted any other health care providers outside of the [address] [place] [address] since your last visit? Include any pap smears or colon screening. No    3) Do you have an Advance Directive on file? no    4) Are you interested in receiving information on Advance Directives? NO      Patient is accompanied by self I have received verbal consent from Gracia Pride to discuss any/all medical information while they are present in the room.

## 2018-02-13 NOTE — MR AVS SNAPSHOT
[age] [address] [facility] 102 [address] [place] [address] 
[phone] Patient: Bekah Ross MRN:  [id number] Visit Information Date & Time Provider Department Dept. Phone Encounter #  
 2/13/2018 11:00 AM Linda Moran, 227 [facility] Internal Medicine [phone] Follow-up Instructions Return in about 6 months (around 8/13/2018). Upcoming Health Maintenance Date Due Hepatitis C Screening 1950 COLONOSCOPY 2/25/1970 BREAST CANCER SCRN MAMMOGRAM 2/25/2002 ZOSTER VACCINE AGE 60> 12/25/2011 DTaP/Tdap/Td series (1 - Tdap) 2/3/2013 GLAUCOMA SCREENING Q2Y 2/25/2017 OSTEOPOROSIS SCREENING (DEXA) 2/25/2017 MEDICARE YEARLY EXAM 8/9/2018 Pneumococcal 65+ Low/Medium Risk (2 of 2 - PPSV23) 2/2/2021 Allergies as of 2/13/2018  Review Complete On: 2/13/2018 By: Linda Moran DO Severity Noted Reaction Type Reactions Erythromycin  05/20/2014    Other (comments) Intestinal problems Current Immunizations  Reviewed on 2/7/2017 Name Date Influenza Vaccine 12/5/2014 Influenza Vaccine Intradermal PF 2/2/2016 Influenza Vaccine PF 2/7/2017 Pneumococcal Conjugate (PCV-13) 2/7/2017 Pneumococcal Polysaccharide (PPSV-23) 2/2/2016 Td 2/2/2013 Not reviewed this visit You Were Diagnosed With   
  
 Codes Comments Essential hypertension    -  Primary ICD-10-CM: I10 
ICD-9-CM: 401.9 Chronic neck pain     ICD-10-CM: M54.2, G89.29 ICD-9-CM: 723.1, 338.29 Chronic left-sided low back pain with left-sided sciatica     ICD-10-CM: M54.42, G89.29 ICD-9-CM: 724.2, 724.3, 338.29 Venous insufficiency of both lower extremities     ICD-10-CM: I87.2 ICD-9-CM: 459.81 Fatigue, unspecified type     ICD-10-CM: R53.83 ICD-9-CM: 780.79 S/P foot surgery, left     ICD-10-CM: R03.012 ICD-9-CM: V45.89  Breast cancer screening     ICD-10-CM: Z12.31 
 ICD-9-CM: V76.10 Age related osteoporosis, unspecified pathological fracture presence     ICD-10-CM: M81.0 ICD-9-CM: 733.01 Vitals BP Pulse Resp Height(growth percentile) Weight(growth percentile) SpO2  
 140/76 (BP 1 Location: Left arm, BP Patient Position: Sitting) 64 19 5' 1\" (1.549 m) 109 lb (49.4 kg) 100% BMI OB Status Smoking Status 20.6 kg/m2 Postmenopausal Never Smoker Vitals History BMI and BSA Data Body Mass Index Body Surface Area  
 20.6 kg/m 2 1.46 m 2 Preferred Pharmacy Pharmacy Name Phone [facility] [facility] [address] [place] [address] [phone] Your Updated Medication List  
  
   
This list is accurate as of: 2/13/18 11:29 AM.  Always use your most recent med list. amLODIPine 5 mg tablet Commonly known as:  Arva Brazen Take 1 Tab by mouth daily. CeleBREX 200 mg capsule Generic drug:  celecoxib Take  by mouth two (2) times a day. Cetirizine 10 mg Cap Take  by mouth.  
  
 cloNIDine HCl 0.1 mg tablet Commonly known as:  CATAPRES Take 1 Tab by mouth two (2) times a day. HYDROcodone-acetaminophen 5-325 mg per tablet Commonly known as:  [place] Boast Take 1 Tab by mouth nightly as needed for Pain. Max Daily Amount: 1 Tab. methocarbamol 750 mg tablet Commonly known as:  ROBAXIN Take  by mouth three (3) times daily as needed. metoprolol tartrate 50 mg tablet Commonly known as:  LOPRESSOR Take 1 Tab by mouth two (2) times a day. Prescriptions Printed Refills HYDROcodone-acetaminophen (NORCO) 5-325 mg per tablet 0 Sig: Take 1 Tab by mouth nightly as needed for Pain. Max Daily Amount: 1 Tab. Class: Print Route: Oral  
  
Follow-up Instructions Return in about 6 months (around 8/13/2018). To-Do List   
 02/13/2018 Imaging:  CLYDE MAMMO BI SCREENING INCL CAD   
  
 02/27/2018 Imaging:  DEXA BONE DENSITY STUDY AXIAL Introducing [address]! [facility] introduces [employer] patient portal. Now you can access parts of your medical record, email your doctor's office, and request medication refills online. 1. In your internet browser, go to [email or web address] 2. Click on the First Time User? Click Here link in the Sign In box. You will see the New Member Sign Up page. 3. Enter your [employer] Access Code exactly as it appears below. You will not need to use this code after youve completed the sign-up process. If you do not sign up before the expiration date, you must request a new code. · [employer] Access Code: [email or web address] Expires: 5/14/2018 11:29 AM 
 
4. Enter the last four digits of your Social Security Number (xxxx) and Date of Birth (mm/dd/yyyy) as indicated and click Submit. You will be taken to the next sign-up page. 5. Create a [employer] ID. This will be your [employer] login ID and cannot be changed, so think of one that is secure and easy to remember. 6. Create a [employer] password. You can change your password at any time. 7. Enter your Password Reset Question and Answer. This can be used at a later time if you forget your password. 8. Enter your e-mail address. You will receive e-mail notification when new information is available in [address] 9. Click Sign Up. You can now view and download portions of your medical record. 10. Click the Download Summary menu link to download a portable copy of your medical information. If you have questions, please visit the Frequently Asked Questions section of the [employer] website. Remember, [employer] is NOT to be used for urgent needs. For medical emergencies, dial 911. Now available from your iPhone and Android! Please provide this summary of care documentation to your next provider. Your primary care clinician is listed as Victorino Schreiber.  If you have any questions after today's visit, please call [phone].

## 2018-02-14 DIAGNOSIS — Z12.39 BREAST CANCER SCREENING: ICD-10-CM

## 2018-02-14 NOTE — TELEPHONE ENCOUNTER
[employer] mail order   Usually sent as 90 day supply with 3 refills  All meds showing no refills remaining

## 2018-02-15 RX ORDER — AMLODIPINE BESYLATE 5 MG/1
5 TABLET ORAL DAILY
Qty: 90 TAB | Refills: 3 | Status: SHIPPED | OUTPATIENT
Start: 2018-02-15 | End: 2019-01-01 | Stop reason: SDUPTHER

## 2018-02-15 RX ORDER — CLONIDINE HYDROCHLORIDE 0.1 MG/1
0.1 TABLET ORAL 2 TIMES DAILY
Qty: 180 TAB | Refills: 3 | Status: SHIPPED | OUTPATIENT
Start: 2018-02-15 | End: 2019-01-01 | Stop reason: SDUPTHER

## 2018-02-15 RX ORDER — METOPROLOL TARTRATE 50 MG/1
50 TABLET ORAL 2 TIMES DAILY
Qty: 180 TAB | Refills: 3 | Status: SHIPPED | OUTPATIENT
Start: 2018-02-15 | End: 2019-01-01 | Stop reason: SDUPTHER

## 2018-08-28 PROBLEM — Z71.89 ACP (ADVANCE CARE PLANNING): Status: ACTIVE | Noted: 2018-01-01

## 2018-08-28 PROBLEM — F33.41 RECURRENT MAJOR DEPRESSIVE DISORDER, IN PARTIAL REMISSION (HCC): Status: ACTIVE | Noted: 2018-01-01

## 2018-08-28 NOTE — MR AVS SNAPSHOT
[address] [facility] [address] [facility] [address] [place] [address] 
[phone] Patient: Monie Lemus MRN:  [id number] Visit Information Date & Time Provider Department Dept. Phone Encounter #  
 8/28/2018 10:45 AM Gordo Montalvo, 227 [facility] Internal Medicine [phone] [id number] Follow-up Instructions Return in about 6 months (around 2/28/2019). Upcoming Health Maintenance Date Due Hepatitis C Screening 1950 COLONOSCOPY 2/25/1970 BREAST CANCER SCRN MAMMOGRAM 2/25/2002 ZOSTER VACCINE AGE 60> 12/25/2011 DTaP/Tdap/Td series (1 - Tdap) 2/3/2013 GLAUCOMA SCREENING Q2Y 2/25/2017 Bone Densitometry (Dexa) Screening 2/25/2017 Influenza Age 5 to Adult 8/1/2018 MEDICARE YEARLY EXAM 8/9/2018 Pneumococcal 65+ Low/Medium Risk (2 of 2 - PPSV23) 2/2/2021 Allergies as of 8/28/2018  Review Complete On: 8/28/2018 By: Gordo Montalvo, DO Severity Noted Reaction Type Reactions Erythromycin  05/20/2014    Other (comments) Intestinal problems Current Immunizations  Reviewed on 2/7/2017 Name Date Influenza Vaccine 12/5/2014 Influenza Vaccine Intradermal PF 2/2/2016 Influenza Vaccine PF 2/7/2017 Pneumococcal Conjugate (PCV-13) 2/7/2017 Pneumococcal Polysaccharide (PPSV-23) 2/2/2016 Td 2/2/2013 Not reviewed this visit You Were Diagnosed With   
  
 Codes Comments Essential hypertension    -  Primary ICD-10-CM: I10 
ICD-9-CM: 401.9 Chronic left-sided low back pain with left-sided sciatica     ICD-10-CM: M54.42, G89.29 ICD-9-CM: 724.2, 724.3, 338.29 Chronic pain of left knee     ICD-10-CM: M25.562, G89.29 ICD-9-CM: 719.46, 338.29 Recurrent major depressive disorder, in partial remission (HCC)     ICD-10-CM: F33.41 
ICD-9-CM: 296.35   
 ACP (advance care planning)     ICD-10-CM: Z71.89 ICD-9-CM: V65.49 Medicare annual wellness visit, subsequent     ICD-10-CM: Z00.00 ICD-9-CM: V70.0 Encounter for screening mammogram for breast cancer     ICD-10-CM: Z12.31 
ICD-9-CM: V76.12 Chronic neck pain     ICD-10-CM: M54.2, G89.29 ICD-9-CM: 723.1, 338.29 Hypercholesterolemia     ICD-10-CM: E78.00 ICD-9-CM: 272.0 Vitals BP Pulse Temp Resp Height(growth percentile) Weight(growth percentile) 124/65 (BP 1 Location: Left arm, BP Patient Position: Sitting) 62 97.8 °F (36.6 °C) (Oral) 18 5' 1\" (1.549 m) 108 lb (49 kg) SpO2 BMI OB Status Smoking Status 98% 20.41 kg/m2 Postmenopausal Never Smoker Vitals History BMI and BSA Data Body Mass Index Body Surface Area  
 20.41 kg/m 2 1.45 m 2 Preferred Pharmacy Pharmacy Name Phone [address] [phone] Your Updated Medication List  
  
   
This list is accurate as of 8/28/18 11:05 AM.  Always use your most recent med list. amLODIPine 5 mg tablet Commonly known as:  Cat Dart[place] Take 1 Tab by mouth daily. CeleBREX 200 mg capsule Generic drug:  celecoxib Take  by mouth two (2) times a day. Cetirizine 10 mg Cap Take  by mouth.  
  
 cloNIDine HCl 0.1 mg tablet Commonly known as:  CATAPRES Take 1 Tab by mouth two (2) times a day. HYDROcodone-acetaminophen 5-325 mg per tablet Commonly known as:  Rolinda Doles Take 1 Tab by mouth nightly as needed for Pain. Max Daily Amount: 1 Tab. methocarbamol 750 mg tablet Commonly known as:  ROBAXIN Take  by mouth three (3) times daily as needed. metoprolol tartrate 50 mg tablet Commonly known as:  LOPRESSOR Take 1 Tab by mouth two (2) times a day. Prescriptions Printed Refills HYDROcodone-acetaminophen (NORCO) 5-325 mg per tablet 0 Sig: Take 1 Tab by mouth nightly as needed for Pain. Max Daily Amount: 1 Tab. Class: Print  Route: Oral  
 We Performed the Following CBC W/O DIFF [20576 CPT(R)] LIPID PANEL [62616 CPT(R)] METABOLIC PANEL, COMPREHENSIVE [70938 CPT(R)] Follow-up Instructions Return in about 6 months (around 2/28/2019). To-Do List   
 08/28/2018 Imaging:  CLYDE MAMMO BI SCREENING INCL CAD Introducing [facility]! [employer] introduces [employer] patient portal. Now you can access parts of your medical record, email your doctor's office, and request medication refills online. 1. In your internet browser, go to [email or web address] 2. Click on the First Time User? Click Here link in the Sign In box. You will see the New Member Sign Up page. 3. Enter your [employer] Access Code exactly as it appears below. You will not need to use this code after youve completed the sign-up process. If you do not sign up before the expiration date, you must request a new code. · [employer] Access Code: [email or web address] Expires: 11/26/2018 11:04 AM 
 
4. Enter the last four digits of your Social Security Number (xxxx) and Date of Birth (mm/dd/yyyy) as indicated and click Submit. You will be taken to the next sign-up page. 5. Create a [employer] ID. This will be your [employer] login ID and cannot be changed, so think of one that is secure and easy to remember. 6. Create a [employer] password. You can change your password at any time. 7. Enter your Password Reset Question and Answer. This can be used at a later time if you forget your password. 8. Enter your e-mail address. You will receive e-mail notification when new information is available in [address] 9. Click Sign Up. You can now view and download portions of your medical record. 10. Click the Download Summary menu link to download a portable copy of your medical information. If you have questions, please visit the Frequently Asked Questions section of the [employer] website.  Remember, [employer] is NOT to be used for urgent needs. For medical emergencies, dial 911. Now available from your iPhone and Android! Please provide this summary of care documentation to your next provider. Your primary care clinician is listed as Nicholas Lewis. If you have any questions after today's visit, please call [phone].

## 2018-08-28 NOTE — PROGRESS NOTES
HISTORY OF PRESENT ILLNESS  Kiara Jimenez is a 79 y.o. female. Pt. comes in for f/u. Has multiple medical problems. Continues to have neck and lower back pain. Brittneea Pleasure as needed. Also followed by orthopedic MD. Her depression anxiety is stable on current medications. Her brother is hospice which is causing more stress. Denies tobacco use. History of alcohol use but has cut back significantly. Reports compliance with medications and diet. Med list and most recent labs/studies reviewed with pt. Trying to be active physically to control weight. Needs med refills. Due for repeat labs. ACP discussed. Reports no other new c/o. Hypertension    Associated symptoms include neck pain. Pertinent negatives include no chest pain, no headaches, no dizziness and no shortness of breath. Neck Pain   Pertinent negatives include no chest pain, no abdominal pain, no headaches and no shortness of breath. Back Pain    Pertinent negatives include no chest pain, no headaches, no abdominal pain and no dysuria. Review of Systems   Constitutional: Negative. HENT: Negative. Eyes: Negative. Respiratory: Negative for shortness of breath. Cardiovascular: Negative for chest pain and leg swelling. Gastrointestinal: Negative for abdominal pain. Genitourinary: Negative for dysuria. Musculoskeletal: Positive for back pain, joint pain and neck pain. Negative for falls. Skin: Negative. Neurological: Negative for dizziness, sensory change, focal weakness and headaches. Psychiatric/Behavioral: Positive for depression. The patient is nervous/anxious. The patient does not have insomnia. All other systems reviewed and are negative. Physical Exam   Constitutional: She is oriented to person, place, and time. She appears well-developed and well-nourished. No distress. HENT:   Head: Normocephalic and atraumatic.    Mouth/Throat: Oropharynx is clear and moist.   Eyes: Conjunctivae are normal.   Neck: Normal range of motion. Neck supple. No JVD present. No thyromegaly present. Cardiovascular: Normal rate, regular rhythm, normal heart sounds and intact distal pulses. No murmur heard. Pulmonary/Chest: Effort normal and breath sounds normal. No respiratory distress. She has no wheezes. She has no rales. Abdominal: Soft. Bowel sounds are normal. She exhibits no distension. Musculoskeletal: She exhibits tenderness (cervicls and lumbars, chronic). She exhibits no edema. djd     Neurological: She is alert and oriented to person, place, and time. Coordination normal.   Skin: Skin is warm and dry. No rash noted. Psychiatric: Her behavior is normal.   Chronically depressed/anxious   Nursing note and vitals reviewed. ASSESSMENT and PLAN  Diagnoses and all orders for this visit:    1. Essential hypertension  -     METABOLIC PANEL, COMPREHENSIVE  -     CBC W/O DIFF  -     LIPID PANEL    2. Chronic left-sided low back pain with left-sided sciatica  -     HYDROcodone-acetaminophen (NORCO) 5-325 mg per tablet; Take 1 Tab by mouth nightly as needed for Pain. Max Daily Amount: 1 Tab. 3. Chronic pain of left knee    4. Recurrent major depressive disorder, in partial remission ([facility] [address]    5. ACP (advance care planning)    6. Medicare annual wellness visit, subsequent    7. Encounter for screening mammogram for breast cancer  -     [facility] MAMMO BI SCREENING INCL CAD; Future    8. Chronic neck pain  -     HYDROcodone-acetaminophen (NORCO) 5-325 mg per tablet; Take 1 Tab by mouth nightly as needed for Pain. Max Daily Amount: 1 Tab. 9. Hypercholesterolemia  -     METABOLIC PANEL, COMPREHENSIVE  -     CBC W/O DIFF  -     LIPID PANEL    10. Encounter for immunization  -     Influenza Vaccine Inactivated (IIV) (FLUAD), Subunit, Adjuvanted, IM (86483)      Follow-up Disposition:  Return in about 6 months (around 2/28/2019).    lab results and schedule of future lab studies reviewed with patient  reviewed diet, exercise and weight control  reviewed medications and side effects in detail  F/u with other MD's as scheduled

## 2018-08-28 NOTE — PROGRESS NOTES
Patient identified with 2 ID's, Name and     Jeanne Cortez is a 79 y.o. female  Chief Complaint   Patient presents with    Hypertension     6 month follow up appointment    Neck Pain     5/10 pain today    Back Pain     2/10 pain today       1. Have you been to the ER, urgent care clinic since your last visit? Hospitalized since your last visit? No     2. Have you seen or consulted any other health care providers outside of the [address] [place] [address] since your last visit? Include any pap smears or colon screening. Yes Dr Evangelina García (orthopedic) 2018 for back and neck pain    In the event something were to happen to you and you were unable to speak on your behalf, do you have an Advance Directive/ Living Will in place stating your wishes? No      If no, would you like information?  yes printed info provided      Visit Vitals    /65 (BP 1 Location: Left arm, BP Patient Position: Sitting)    Pulse 62    Temp 97.8 °F (36.6 °C) (Oral)    Resp 18    Ht 5' 1\" (1.549 m)    Wt 108 lb (49 kg)    SpO2 98%    BMI 20.41 kg/m2       Medication Reconciliation reviewed with patient on this date    Fall Risk Assessment, last 12 mths 2018   Able to walk? Yes   Fall in past 12 months? No   Fall with injury? -   Number of falls in past 12 months -   Fall Risk Score -       PHQ over the last two weeks 2018   Little interest or pleasure in doing things Not at all   Feeling down, depressed, irritable, or hopeless Not at all   Total Score PHQ 2 0       Learning Assessment 2015   PRIMARY LEARNER Patient   BARRIERS PRIMARY LEARNER NONE   PRIMARY LANGUAGE ENGLISH   LEARNER PREFERENCE PRIMARY LISTENING   ANSWERED BY patient   RELATIONSHIP SELF       Abuse Screening Questionnaire 2018   Do you ever feel afraid of your partner? N   Are you in a relationship with someone who physically or mentally threatens you? N   Is it safe for you to go home?  Rica Ng

## 2018-08-28 NOTE — PROGRESS NOTES
Shahbaz Luciano is a 79 y.o. female and presents for annual Medicare Wellness Visit. Problem List: Reviewed with patient and discussed risk factors. Patient Active Problem List   Diagnosis Code    FHx: colon cancer Z80.0    Cardiac murmur R01.1    Abnormal pap [id number]    HTN (hypertension) I10    Chronic neck pain M54.2, G89.29    S/P Mohs surgery for basal cell carcinoma Z98.890, Z85.828    Chronic left-sided low back pain with left-sided sciatica M54.42, G89.29    Venous insufficiency of both lower extremities I87.2    Chronic pain of left knee M25.562, G89.29    Fatigue R53.83    S/P foot surgery, left Z98.890    Recurrent major depressive disorder, in partial remission (HCC) F33.41    ACP (advance care planning) Z71.89       Current medical providers:  Patient Care Team:  Danny Bautista DO as PCP - [address] [place] [address] Dr Ramos, NP as Nurse Practitioner (Nurse Practitioner)    PSH: Reviewed with patient  Past Surgical History:   Procedure Laterality Date    HX BUNIONECTOMY      HX GYN      HX MALIGNANT SKIN LESION EXCISION      basal cell carcinoma        SH: Reviewed with patient  Social History   Substance Use Topics    Smoking status: Never Smoker    Smokeless tobacco: Never Used    Alcohol use 0.0 oz/week     7 - 10 Glasses of wine per week      Comment: 3 or 4 drinks white wine per week       FH: Reviewed with patient  Family History   Problem Relation Age of Onset    Bleeding Prob Father     Cancer Father      melanoma    Bleeding Prob Brother     Lung Disease Brother        Medications/Allergies: Reviewed with patient  Current Outpatient Prescriptions on File Prior to Visit   Medication Sig Dispense Refill    amLODIPine (NORVASC) 5 mg tablet Take 1 Tab by mouth daily. 90 Tab 3    cloNIDine HCl (CATAPRES) 0.1 mg tablet Take 1 Tab by mouth two (2) times a day. 180 Tab 3    metoprolol tartrate (LOPRESSOR) 50 mg tablet Take 1 Tab by mouth two (2) times a day.  180 Tab 3    HYDROcodone-acetaminophen (NORCO) 5-325 mg per tablet Take 1 Tab by mouth nightly as needed for Pain. Max Daily Amount: 1 Tab. 30 Tab 0    celecoxib (CELEBREX) 200 mg capsule Take  by mouth two (2) times a day.  methocarbamol (ROBAXIN) 750 mg tablet Take  by mouth three (3) times daily as needed.  Cetirizine 10 mg cap Take  by mouth. No current facility-administered medications on file prior to visit. Allergies   Allergen Reactions    Erythromycin Other (comments)     Intestinal problems         Objective:  Visit Vitals    /65 (BP 1 Location: Left arm, BP Patient Position: Sitting)    Pulse 62    Temp 97.8 °F (36.6 °C) (Oral)    Resp 18    Ht 5' 1\" (1.549 m)    Wt 108 lb (49 kg)    SpO2 98%    BMI 20.41 kg/m2    Body mass index is 20.41 kg/(m^2). Assessment of cognitive impairment: Alert and oriented x 3    Depression Screen:   PHQ over the last two weeks 2/13/2018   Little interest or pleasure in doing things Not at all   Feeling down, depressed, irritable, or hopeless Not at all   Total Score PHQ 2 0       Fall Risk Assessment:    Fall Risk Assessment, last 12 mths 2/13/2018   Able to walk? Yes   Fall in past 12 months? No   Fall with injury? -   Number of falls in past 12 months -   Fall Risk Score -       Functional Ability:   Does the patient exhibit a steady gait? yes   How long did it take the patient to get up and walk from a sitting position? 7 sec   Is the patient self reliant?  (ie can do own laundry, meals, household chores)  yes     Does the patient handle his/her own medications? yes     Does the patient handle his/her own money? yes     Is the patients home safe (ie good lighting, handrails on stairs and bath, etc.)? yes     Did you notice or did patient express any hearing difficulties? no     Did you notice or did patient express any vision difficulties?   no     Were distance and reading eye charts used?   no       Advance Care Planning:   Patient was offered the opportunity to discuss advance care planning:  yes     Does patient have an Advance Directive:  no   If no, did you provide information on Caring Connections? yes       Plan:      No orders of the defined types were placed in this encounter. Health Maintenance   Topic Date Due    Hepatitis C Screening  1950    COLONOSCOPY  02/25/1970    BREAST CANCER SCRN MAMMOGRAM  02/25/2002    ZOSTER VACCINE AGE 60>  12/25/2011    DTaP/Tdap/Td series (1 - Tdap) 02/03/2013    GLAUCOMA SCREENING Q2Y  02/25/2017    Bone Densitometry (Dexa) Screening  02/25/2017    Influenza Age 5 to Adult  08/01/2018    MEDICARE YEARLY EXAM  08/09/2018    Pneumococcal 65+ Low/Medium Risk (2 of 2 - PPSV23) 02/02/2021       *Patient verbalized understanding and agreement with the plan. A copy of the After Visit Summary with personalized health plan was given to the patient today.

## 2018-08-28 NOTE — PROGRESS NOTES
Kamilah Bhat is a 79 y.o. female  who presents for routine immunizations. She denies any symptoms , reactions or allergies that would exclude them from being immunized today. Risks and adverse reactions were discussed and the VIS was given to them. All questions were addressed. She was observed for 10 min post injection. There were no reactions observed.     Pecolia Lulas, LPN

## 2018-08-28 NOTE — PATIENT INSTRUCTIONS
Vaccine Information Statement    Influenza (Flu) Vaccine (Inactivated or Recombinant): What you need to know    Many Vaccine Information Statements are available in English and other languages. See www.immunize.org/vis  Hojas de Información Sobre Vacunas están disponibles en Español y en muchos otros idiomas. Visite www.immunize.org/vis    1. Why get vaccinated? Influenza (flu) is a contagious disease that spreads around the [place] every year, usually between October and May. Flu is caused by influenza viruses, and is spread mainly by coughing, sneezing, and close contact. Anyone can get flu. Flu strikes suddenly and can last several days. Symptoms vary by age, but can include:   fever/chills   sore throat   muscle aches   fatigue   cough   headache    runny or stuffy nose    Flu can also lead to pneumonia and blood infections, and cause diarrhea and seizures in children. If you have a medical condition, such as heart or lung disease, flu can make it worse. Flu is more dangerous for some people. Infants and young children, people 72years of age and older, pregnant women, and people with certain health conditions or a weakened immune system are at greatest risk. Each year thousands of people in the [facility] die from flu, and many more are hospitalized. Flu vaccine can:   keep you from getting flu,   make flu less severe if you do get it, and   keep you from spreading flu to your family and other people. 2. Inactivated and recombinant flu vaccines    A dose of flu vaccine is recommended every flu season. Children 6 months through 6years of age may need two doses during the same flu season. Everyone else needs only one dose each flu season.        Some inactivated flu vaccines contain a very small amount of a mercury-based preservative called thimerosal. Studies have not shown thimerosal in vaccines to be harmful, but flu vaccines that do not contain thimerosal are available. There is no live flu virus in flu shots. They cannot cause the flu. There are many flu viruses, and they are always changing. Each year a new flu vaccine is made to protect against three or four viruses that are likely to cause disease in the upcoming flu season. But even when the vaccine doesnt exactly match these viruses, it may still provide some protection    Flu vaccine cannot prevent:   flu that is caused by a virus not covered by the vaccine, or   illnesses that look like flu but are not. It takes about 2 weeks for protection to develop after vaccination, and protection lasts through the flu season. 3. Some people should not get this vaccine    Tell the person who is giving you the vaccine:     If you have any severe, life-threatening allergies. If you ever had a life-threatening allergic reaction after a dose of flu vaccine, or have a severe allergy to any part of this vaccine, you may be advised not to get vaccinated. Most, but not all, types of flu vaccine contain a small amount of egg protein.  If you ever had Guillain-Barré Syndrome (also called GBS). Some people with a history of GBS should not get this vaccine. This should be discussed with your doctor.  If you are not feeling well. It is usually okay to get flu vaccine when you have a mild illness, but you might be asked to come back when you feel better. 4. Risks of a vaccine reaction    With any medicine, including vaccines, there is a chance of reactions. These are usually mild and go away on their own, but serious reactions are also possible. Most people who get a flu shot do not have any problems with it.      Minor problems following a flu shot include:    soreness, redness, or swelling where the shot was given     hoarseness   sore, red or itchy eyes   cough   fever   aches   headache   itching   fatigue  If these problems occur, they usually begin soon after the shot and last 1 or 2 days. More serious problems following a flu shot can include the following:     There may be a small increased risk of Guillain-Barré Syndrome (GBS) after inactivated flu vaccine. This risk has been estimated at 1 or 2 additional cases per million people vaccinated. This is much lower than the risk of severe complications from flu, which can be prevented by flu vaccine.  Young children who get the flu shot along with pneumococcal vaccine (PCV13) and/or DTaP vaccine at the same time might be slightly more likely to have a seizure caused by fever. Ask your doctor for more information. Tell your doctor if a child who is getting flu vaccine has ever had a seizure. Problems that could happen after any injected vaccine:      People sometimes faint after a medical procedure, including vaccination. Sitting or lying down for about 15 minutes can help prevent fainting, and injuries caused by a fall. Tell your doctor if you feel dizzy, or have vision changes or ringing in the ears.  Some people get severe pain in the shoulder and have difficulty moving the arm where a shot was given. This happens very rarely.  Any medication can cause a severe allergic reaction. Such reactions from a vaccine are very rare, estimated at about 1 in a million doses, and would happen within a few minutes to a few hours after the vaccination. As with any medicine, there is a very remote chance of a vaccine causing a serious injury or death. The safety of vaccines is always being monitored. For more information, visit: www.cdc.gov/vaccinesafety/    5. What if there is a serious reaction? What should I look for?  Look for anything that concerns you, such as signs of a severe allergic reaction, very high fever, or unusual behavior.     Signs of a severe allergic reaction can include hives, swelling of the face and throat, difficulty breathing, a fast heartbeat, dizziness, and weakness - usually within a few minutes to a few hours after the vaccination. What should I do?  If you think it is a severe allergic reaction or other emergency that cant wait, call 9-1-1 and get the person to the nearest hospital. Otherwise, call your doctor.  Reactions should be reported to the Vaccine Adverse Event Reporting System (VAERS). Your doctor should file this report, or you can do it yourself through  the VAERS web site at www.vaers. [facility].gov, or by calling 1-213.421.8612. VAERS does not give medical advice. 6. The National Vaccine Injury Compensation Program    The [facility] Vaccine Injury Compensation Program (VICP) is a federal program that was created to compensate people who may have been injured by certain vaccines. Persons who believe they may have been injured by a vaccine can learn about the program and about filing a claim by calling 4-908.800.2848 or visiting the [employer] website at www.[facility].gov/vaccinecompensation. There is a time limit to file a claim for compensation. 7. How can I learn more?  Ask your healthcare provider. He or she can give you the vaccine package insert or suggest other sources of information.  Call your local or state health department.  Contact the Centers for Disease Control and Prevention (CDC):  - Call 9-703.479.9918 (1-800-CDC-INFO) or  - Visit CDCs website at www.cdc.gov/flu    Vaccine Information Statement   Inactivated Influenza Vaccine   8/7/2015  42 WEI Barros [id number]-[id number]    Department of Health and Human Services  Centers for Disease Control and Prevention    Office Use Only

## 2018-08-28 NOTE — PROGRESS NOTES
Schedule of Personalized Health Plan  (Provide Copy to Patient)  The best way to stay healthy is to live a healthy lifestyle. A healthy lifestyle includes regular exercise, eating a well-balanced diet, keeping a healthy weight and not smoking. Regular physical exams and screening tests are another important way to take care of yourself. Preventive exams provided by health care providers can find health problems early when treatment works best and can keep you from getting certain diseases or illnesses. Preventive services include exams, lab tests, screenings, shots, monitoring and information to help you take care of your own health. All people over 65 should have a pneumonia shot. Pneumonia shots are usually only needed once in a lifetime unless your doctor decides differently. All people over 65 should have a yearly flu shot. People over 65 are at medium to high risk for Hepatitis B. Three shots are needed for complete protection. In addition to your physical exam, some screening tests are recommended:    Bone mass measurement (dexa scan) is recommended every two years  Diabetes Mellitus screening is recommended every year. Glaucoma is an eye disease caused by high pressure in the eye. An eye exam is recommended every year. Cardiovascular screening tests that check your cholesterol and other blood fat (lipid) levels are recommended every five years. Colorectal Cancer screening tests help to find pre-cancerous polyps (growths in the colon) so they can be removed before they turn into cancer. Tests ordered for screening depend on your personal and family history risk factors.     Screening for Breast Cancer is recommended yearly with a mammogram.    Screening for Cervical Cancer is recommended every two years (annually for certain risk factors, such as previous history of STD or abnormal PAP in past 7 years), with a Pelvic Exam with PAP    Here is a list of your current Health Maintenance items with a due date:  Health Maintenance   Topic Date Due    Hepatitis C Screening  1950    COLONOSCOPY  02/25/1970    BREAST CANCER SCRN MAMMOGRAM  02/25/2002    ZOSTER VACCINE AGE 60>  12/25/2011    DTaP/Tdap/Td series (1 - Tdap) 02/03/2013    GLAUCOMA SCREENING Q2Y  02/25/2017    Bone Densitometry (Dexa) Screening  02/25/2017    Influenza Age 5 to Adult  08/01/2018    MEDICARE YEARLY EXAM  08/09/2018    Pneumococcal 65+ Low/Medium Risk (2 of 2 - PPSV23) 02/02/2021

## 2018-09-07 NOTE — PROGRESS NOTES
Elevated liver numbers which is consistent with alcohol drinking  Other labs are stable  She is cut back on drinking

## 2019-01-01 ENCOUNTER — HOSPITAL ENCOUNTER (OUTPATIENT)
Dept: MAMMOGRAPHY | Age: 69
Discharge: HOME | End: 2019-06-14
Attending: INTERNAL MEDICINE
Payer: MEDICARE

## 2019-01-01 ENCOUNTER — HOSPITAL ENCOUNTER (OUTPATIENT)
Dept: LAB | Age: 69
Discharge: HOME | End: 2019-04-23
Payer: MEDICARE

## 2019-01-01 ENCOUNTER — OFFICE VISIT (OUTPATIENT)
Dept: INTERNAL MEDICINE CLINIC | Age: 69
End: 2019-01-01

## 2019-01-01 ENCOUNTER — HOSPITAL ENCOUNTER (OUTPATIENT)
Dept: LAB | Age: 69
Discharge: HOME | End: 2019-04-30
Payer: MEDICARE

## 2019-01-01 ENCOUNTER — HOSPITAL ENCOUNTER (OUTPATIENT)
Dept: ULTRASOUND IMAGING | Age: 69
Discharge: HOME | End: 2019-06-14
Attending: INTERNAL MEDICINE
Payer: MEDICARE

## 2019-01-01 VITALS
DIASTOLIC BLOOD PRESSURE: 58 MMHG | HEART RATE: 78 BPM | SYSTOLIC BLOOD PRESSURE: 95 MMHG | RESPIRATION RATE: 16 BRPM | HEIGHT: 61 IN | WEIGHT: 94 LBS | BODY MASS INDEX: 17.75 KG/M2 | OXYGEN SATURATION: 98 %

## 2019-01-01 VITALS
DIASTOLIC BLOOD PRESSURE: 57 MMHG | BODY MASS INDEX: 19.26 KG/M2 | TEMPERATURE: 96.6 F | OXYGEN SATURATION: 99 % | RESPIRATION RATE: 16 BRPM | HEIGHT: 61 IN | WEIGHT: 102 LBS | HEART RATE: 80 BPM | SYSTOLIC BLOOD PRESSURE: 120 MMHG

## 2019-01-01 VITALS
BODY MASS INDEX: 17.56 KG/M2 | RESPIRATION RATE: 16 BRPM | OXYGEN SATURATION: 95 % | SYSTOLIC BLOOD PRESSURE: 125 MMHG | HEART RATE: 90 BPM | WEIGHT: 93 LBS | DIASTOLIC BLOOD PRESSURE: 77 MMHG | TEMPERATURE: 95.3 F | HEIGHT: 61 IN

## 2019-01-01 DIAGNOSIS — R29.6 FALLS FREQUENTLY: ICD-10-CM

## 2019-01-01 DIAGNOSIS — I10 ESSENTIAL HYPERTENSION: ICD-10-CM

## 2019-01-01 DIAGNOSIS — R71.8 ELEVATED MCV: ICD-10-CM

## 2019-01-01 DIAGNOSIS — F33.41 RECURRENT MAJOR DEPRESSIVE DISORDER, IN PARTIAL REMISSION (HCC): ICD-10-CM

## 2019-01-01 DIAGNOSIS — Z12.31 ENCOUNTER FOR SCREENING MAMMOGRAM FOR BREAST CANCER: ICD-10-CM

## 2019-01-01 DIAGNOSIS — R53.83 FATIGUE, UNSPECIFIED TYPE: ICD-10-CM

## 2019-01-01 DIAGNOSIS — R53.83 FATIGUE, UNSPECIFIED TYPE: Primary | ICD-10-CM

## 2019-01-01 DIAGNOSIS — M54.2 CHRONIC NECK PAIN: ICD-10-CM

## 2019-01-01 DIAGNOSIS — R63.4 WEIGHT LOSS: ICD-10-CM

## 2019-01-01 DIAGNOSIS — I10 ESSENTIAL HYPERTENSION: Primary | ICD-10-CM

## 2019-01-01 DIAGNOSIS — I95.2 HYPOTENSION DUE TO DRUGS: ICD-10-CM

## 2019-01-01 DIAGNOSIS — G89.29 CHRONIC NECK PAIN: ICD-10-CM

## 2019-01-01 DIAGNOSIS — R55 SYNCOPE, UNSPECIFIED SYNCOPE TYPE: Primary | ICD-10-CM

## 2019-01-01 DIAGNOSIS — I87.2 VENOUS INSUFFICIENCY OF BOTH LOWER EXTREMITIES: ICD-10-CM

## 2019-01-01 DIAGNOSIS — R79.89 LFT ELEVATION: ICD-10-CM

## 2019-01-01 DIAGNOSIS — M54.42 CHRONIC LEFT-SIDED LOW BACK PAIN WITH LEFT-SIDED SCIATICA: ICD-10-CM

## 2019-01-01 DIAGNOSIS — E55.9 VITAMIN D DEFICIENCY: ICD-10-CM

## 2019-01-01 DIAGNOSIS — Z12.39 BREAST CANCER SCREENING: ICD-10-CM

## 2019-01-01 DIAGNOSIS — G89.29 CHRONIC LEFT-SIDED LOW BACK PAIN WITH LEFT-SIDED SCIATICA: ICD-10-CM

## 2019-01-01 LAB
25(OH)D3+25(OH)D2 SERPL-MCNC: 85.1 NG/ML (ref 30–100)
ALBUMIN SERPL-MCNC: 4.4 G/DL (ref 3.6–4.8)
ALBUMIN/GLOB SERPL: 1.5 {RATIO} (ref 1.2–2.2)
ALP SERPL-CCNC: 122 IU/L (ref 39–117)
ALT SERPL-CCNC: 30 IU/L (ref 0–32)
AST SERPL-CCNC: 65 IU/L (ref 0–40)
BILIRUB SERPL-MCNC: 0.7 MG/DL (ref 0–1.2)
BUN SERPL-MCNC: 10 MG/DL (ref 8–27)
BUN/CREAT SERPL: 13 (ref 12–28)
CALCIUM SERPL-MCNC: 9.7 MG/DL (ref 8.7–10.3)
CHLORIDE SERPL-SCNC: 89 MMOL/L (ref 96–106)
CO2 SERPL-SCNC: 20 MMOL/L (ref 20–29)
CREAT SERPL-MCNC: 0.8 MG/DL (ref 0.57–1)
ERYTHROCYTE [DISTWIDTH] IN BLOOD BY AUTOMATED COUNT: 13.5 % (ref 12.3–15.4)
ETHANOL BLD GC-MCNC: NEGATIVE %
FOLATE SERPL-MCNC: >20 NG/ML
FOLATE SERPL-MCNC: >20 NG/ML
GLOBULIN SER CALC-MCNC: 3 G/DL (ref 1.5–4.5)
GLUCOSE SERPL-MCNC: 90 MG/DL (ref 65–99)
HBV E AG SERPL QL IA: NEGATIVE
HCT VFR BLD AUTO: 37.2 % (ref 34–46.6)
HCV AB S/CO SERPL IA: <0.1 S/CO RATIO (ref 0–0.9)
HGB BLD-MCNC: 12 G/DL (ref 11.1–15.9)
MCH RBC QN AUTO: 33.9 PG (ref 26.6–33)
MCHC RBC AUTO-ENTMCNC: 32.3 G/DL (ref 31.5–35.7)
MCV RBC AUTO: 105 FL (ref 79–97)
PLATELET # BLD AUTO: 207 X10E3/UL (ref 150–379)
POTASSIUM SERPL-SCNC: 4.6 MMOL/L (ref 3.5–5.2)
PROT SERPL-MCNC: 7.4 G/DL (ref 6–8.5)
RBC # BLD AUTO: 3.54 X10E6/UL (ref 3.77–5.28)
SODIUM SERPL-SCNC: 133 MMOL/L (ref 134–144)
SPECIMEN STATUS REPORT, ROLRST: NORMAL
TSH SERPL DL<=0.005 MIU/L-ACNC: 2.37 UIU/ML (ref 0.45–4.5)
VIT B12 SERPL-MCNC: 509 PG/ML (ref 232–1245)
VIT B12 SERPL-MCNC: 550 PG/ML (ref 232–1245)
WBC # BLD AUTO: 5.6 X10E3/UL (ref 3.4–10.8)

## 2019-01-01 PROCEDURE — 82607 VITAMIN B-12: CPT

## 2019-01-01 PROCEDURE — 36415 COLL VENOUS BLD VENIPUNCTURE: CPT

## 2019-01-01 PROCEDURE — 80320 DRUG SCREEN QUANTALCOHOLS: CPT

## 2019-01-01 PROCEDURE — 87350 HEPATITIS BE AG IA: CPT

## 2019-01-01 PROCEDURE — 84443 ASSAY THYROID STIM HORMONE: CPT

## 2019-01-01 PROCEDURE — 86803 HEPATITIS C AB TEST: CPT

## 2019-01-01 PROCEDURE — 82746 ASSAY OF FOLIC ACID SERUM: CPT

## 2019-01-01 PROCEDURE — 85027 COMPLETE CBC AUTOMATED: CPT

## 2019-01-01 PROCEDURE — 82306 VITAMIN D 25 HYDROXY: CPT

## 2019-01-01 PROCEDURE — 77067 SCR MAMMO BI INCL CAD: CPT

## 2019-01-01 PROCEDURE — 80053 COMPREHEN METABOLIC PANEL: CPT

## 2019-01-01 PROCEDURE — 80307 DRUG TEST PRSMV CHEM ANLYZR: CPT

## 2019-01-01 PROCEDURE — 76705 ECHO EXAM OF ABDOMEN: CPT

## 2019-01-01 RX ORDER — VENLAFAXINE HYDROCHLORIDE 150 MG/1
150 CAPSULE, EXTENDED RELEASE ORAL DAILY
Qty: 90 CAP | Refills: 1 | Status: SHIPPED | OUTPATIENT
Start: 2019-01-01

## 2019-01-01 RX ORDER — ASPIRIN 81 MG/1
TABLET ORAL DAILY
COMMUNITY

## 2019-01-01 RX ORDER — AMLODIPINE BESYLATE 5 MG/1
TABLET ORAL
Qty: 90 TAB | Refills: 3 | Status: SHIPPED | OUTPATIENT
Start: 2019-01-01 | End: 2019-01-01 | Stop reason: ALTCHOICE

## 2019-01-01 RX ORDER — OMEPRAZOLE 40 MG/1
CAPSULE, DELAYED RELEASE ORAL
Refills: 3 | COMMUNITY
Start: 2019-01-01

## 2019-01-01 RX ORDER — VENLAFAXINE HYDROCHLORIDE 75 MG/1
75 CAPSULE, EXTENDED RELEASE ORAL DAILY
Qty: 30 CAP | Refills: 2 | Status: SHIPPED | OUTPATIENT
Start: 2019-01-01 | End: 2019-01-01 | Stop reason: SDUPTHER

## 2019-01-01 RX ORDER — HYDROCODONE BITARTRATE AND ACETAMINOPHEN 5; 325 MG/1; MG/1
1 TABLET ORAL
Qty: 30 TAB | Refills: 0 | Status: SHIPPED | OUTPATIENT
Start: 2019-01-01 | End: 2019-01-01

## 2019-01-01 RX ORDER — METOPROLOL TARTRATE 50 MG/1
TABLET ORAL
Qty: 180 TAB | Refills: 3 | Status: SHIPPED | OUTPATIENT
Start: 2019-01-01

## 2019-01-01 RX ORDER — CLONIDINE HYDROCHLORIDE 0.1 MG/1
TABLET ORAL
Qty: 180 TAB | Refills: 3 | Status: SHIPPED | OUTPATIENT
Start: 2019-01-01 | End: 2019-01-01 | Stop reason: ALTCHOICE

## 2019-01-01 RX ORDER — BISMUTH SUBSALICYLATE 262 MG
1 TABLET,CHEWABLE ORAL DAILY
COMMUNITY

## 2019-03-19 NOTE — PROGRESS NOTES
HISTORY OF PRESENT ILLNESS Rosy Blum is a 81 y.o. female. Pt. comes in for f/u. Has multiple medical problems. Recent hospitalization at [facility] with dysphagia, neck pain and headaches. I reviewed the records that she is brought in. Found to have esophageal strictures. Had EGD with dilation and Botox injection which has helped some. Followed by GI MD.  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 Seen by neurosurgery and felt everything to be stable. Continues to have chronic back pain. Takes Norco occasionally. Has chronic fatigue. Has chronic depression and anxiety. Her brother passed away recently which has made her symptoms worse. Denies smoking. Quit drinking a while back. Denies any recent falls. Reports compliance with medications and diet. Med list and most recent labs/studies reviewed with pt. Trying to be active physically to control weight. Needs med refills. Reports no other new c/o. HPI Review of Systems Constitutional: Negative. HENT: Negative. Eyes: Negative. Respiratory: Negative for shortness of breath. Cardiovascular: Negative for chest pain and leg swelling. Gastrointestinal: Negative for abdominal pain. Dysphagia Genitourinary: Negative for dysuria. Musculoskeletal: Positive for back pain, joint pain and neck pain. Negative for falls. Skin: Negative. Neurological: Negative for dizziness, sensory change, focal weakness and headaches. Psychiatric/Behavioral: Positive for depression. The patient is nervous/anxious. The patient does not have insomnia. All other systems reviewed and are negative. Physical Exam  
Constitutional: She is oriented to person, place, and time. She appears well-developed and well-nourished. No distress. HENT:  
Head: Normocephalic and atraumatic.   
Mouth/Throat: Oropharynx is clear and moist.  
Eyes: Conjunctivae are normal.  
 Neck: Normal range of motion. Neck supple. No JVD present. No thyromegaly present. Cardiovascular: Normal rate, regular rhythm, normal heart sounds and intact distal pulses. No murmur heard. Pulmonary/Chest: Effort normal and breath sounds normal. No respiratory distress. She has no wheezes. She has no rales. Abdominal: Soft. Bowel sounds are normal. She exhibits no distension. There is no tenderness. Musculoskeletal: She exhibits tenderness (cervicls and lumbars, chronic). She exhibits no edema. djd Neurological: She is alert and oriented to person, place, and time. Coordination normal.  
Skin: Skin is warm and dry. No rash noted. Psychiatric: Her behavior is normal.  
Chronically depressed/anxious Nursing note and vitals reviewed. ASSESSMENT and PLAN Diagnoses and all orders for this visit: 1. Essential hypertension 2. Recurrent major depressive disorder, in partial remission ([facility] [address] 3. Chronic neck pain 
-     HYDROcodone-acetaminophen (NORCO) 5-325 mg per tablet; Take 1 Tab by mouth nightly as needed for Pain for up to 30 days. Max Daily Amount: 1 Tab. 4. Venous insufficiency of both lower extremities 5. Fatigue, unspecified type 6. Chronic left-sided low back pain with left-sided sciatica 
-     HYDROcodone-acetaminophen (NORCO) 5-325 mg per tablet; Take 1 Tab by mouth nightly as needed for Pain for up to 30 days. Max Daily Amount: 1 Tab. 7. Encounter for screening mammogram for breast cancer -     [facility] MAMMO BI SCREENING INCL CAD; Future Other orders -     Start venlafaxine-SR [facility] 75 mg capsule; Take 1 Cap by mouth daily. Follow-up Disposition: 
Return in about 4 weeks (around 4/16/2019). lab results and schedule of future lab studies reviewed with patient 
reviewed diet, exercise and weight control 
reviewed medications and side effects in detail F/u with other MD's as scheduled

## 2019-03-19 NOTE — PROGRESS NOTES
Identified pt with two pt identifiers(name and ). Reviewed record in preparation for visit and have obtained necessary documentation. All patient medications has been reviewed. Chief Complaint Patient presents with  Hypertension 6 month f/u  Back Pain Health Maintenance Due Topic  Hepatitis C Screening  COLONOSCOPY  Shingrix Vaccine Age 50> (1 of 2)  BREAST CANCER SCRN MAMMOGRAM   
 DTaP/Tdap/Td series (1 - Tdap)  GLAUCOMA SCREENING Q2Y  Bone Densitometry (Dexa) Screening Vitals:  
 19 1056 BP: 120/57 Pulse: 80 Resp: 16 Temp: 96.6 °F (35.9 °C) TempSrc: Oral  
SpO2: 99% Weight: 102 lb (46.3 kg) Height: 5' 1\" (1.549 m) PainSc:   3 PainLoc: Back Coordination of Care Questionnaire: 
:  
1) Have you been to an emergency room, urgent care, or hospitalized since your last visit? No    
 
2. Have seen or consulted any other health care provider since your last visit? Yes [facility] upper GI endoscopy/ botox injection 2019 3) Do you have an Advanced Directive/ Living Will in place? NO If yes, do we have a copy on file NO If no, would you like information YES Patient is accompanied by self I have received verbal consent from Onesimo Oviedo to discuss any/all medical information while they are present in the room.

## 2019-04-23 PROBLEM — R29.6 FALLS FREQUENTLY: Status: ACTIVE | Noted: 2019-01-01

## 2019-04-23 NOTE — PROGRESS NOTES
Sj Peacock is a 81 y.o. female Chief Complaint Patient presents with  Depression  Hypertension 1. Have you been to the ER, urgent care clinic since your last visit? Hospitalized since your last visit? No  
 
2. Have you seen or consulted any other health care providers outside of the [address] [place] [address] since your last visit? Include any pap smears or colon screening. No  
 
Visit Vitals BP 95/58 Pulse 78 Resp 16 Ht 5' 1\" (1.549 m) Wt 94 lb (42.6 kg) SpO2 98% BMI 17.76 kg/m²

## 2019-04-23 NOTE — PROGRESS NOTES
HISTORY OF PRESENT ILLNESS Marisol Fernandez is a 81 y.o. female. Pt. comes in for f/u. Has multiple medical problems. Reports 2 recent episodes of fainting and falling. No major injury. Tells me she feels it coming on once she is on the floor she is awake and back to normal.  Denies dizziness, headaches, chest pain, dyspnea, focal neurological issues. She is on 3 BP medications. Her BP is low today. Continues to have issues with dysphagia. Followed by GI. Pathology report shows positive H. pylori but tells me she has not been treated for it because she cannot take the pills. She has lost a lot of weight because she cannot eat much. Her depression is improved on Effexor. Continues to have some chronic pain. Uses Norco as needed. Reports not drinking alcohol anymore. Denies smoking. Reports compliance with medications. Med list and most recent labs/studies reviewed with pt. Trying to be active physically as tolerated. Due for repeat labs. Reports no other new c/o. HPI Review of Systems Constitutional: Positive for weight loss. HENT: Negative. Eyes: Negative. Respiratory: Negative for shortness of breath. Cardiovascular: Negative for chest pain and leg swelling. Gastrointestinal: Negative for abdominal pain. Dysphagia Genitourinary: Negative for dysuria. Musculoskeletal: Positive for back pain, falls, joint pain and neck pain. Skin: Negative. Neurological: Positive for loss of consciousness. Negative for dizziness, tingling, sensory change, speech change, focal weakness, seizures and headaches. Psychiatric/Behavioral: Positive for depression. The patient is nervous/anxious. The patient does not have insomnia. All other systems reviewed and are negative. Physical Exam  
Constitutional: She is oriented to person, place, and time. She appears well-developed and well-nourished. No distress. Pleasant thin/underweight lady HENT:  
 Head: Normocephalic and atraumatic. Mouth/Throat: Oropharynx is clear and moist.  
Eyes: Conjunctivae are normal.  
Neck: Normal range of motion. Neck supple. No JVD present. No thyromegaly present. Cardiovascular: Normal rate, regular rhythm, normal heart sounds and intact distal pulses. No murmur heard. Pulmonary/Chest: Effort normal and breath sounds normal. No respiratory distress. She has no wheezes. She has no rales. Abdominal: Soft. Bowel sounds are normal. She exhibits no distension. There is no tenderness. Musculoskeletal: She exhibits tenderness (cervicls and lumbars, chronic). She exhibits no edema. djd Neurological: She is alert and oriented to person, place, and time. No cranial nerve deficit. She exhibits normal muscle tone. Coordination normal.  
Skin: Skin is warm and dry. No rash noted. Psychiatric: Her behavior is normal.  
Chronically depressed/anxious Nursing note and vitals reviewed. ASSESSMENT and PLAN Diagnoses and all orders for this visit: 1. Syncope, unspecified syncope type -     METABOLIC PANEL, COMPREHENSIVE 
-     CBC W/O DIFF 
-     TSH 3RD GENERATION 2. Hypotension due to drugs Hold amlodipine and clonidine Continue metoprolol daily 3. Essential hypertension 4. Recurrent major depressive disorder, in partial remission ([address] 5. Falls frequently 6. Weight loss -     METABOLIC PANEL, COMPREHENSIVE 
-     CBC W/O DIFF 
-     TSH 3RD GENERATION Follow-up and Dispositions · Return in about 1 week (around 4/30/2019). lab results and schedule of future lab studies reviewed with patient 
reviewed diet, exercise and weight control 
reviewed medications and side effects in detail Advised patient to increase fluid intake Advised patient to increase calorie intake to avoid further weight loss F/u with other MD's as scheduled

## 2019-04-30 PROBLEM — R79.89 LFT ELEVATION: Status: ACTIVE | Noted: 2019-01-01

## 2019-04-30 PROBLEM — R74.01 MATERNAL AST (ASPARTATE AMINOTRANSFERASE) ELEVATION: Status: ACTIVE | Noted: 2019-01-01

## 2019-04-30 PROBLEM — R71.8 ELEVATED MCV: Status: ACTIVE | Noted: 2019-01-01

## 2019-04-30 PROBLEM — R63.4 WEIGHT LOSS: Status: ACTIVE | Noted: 2019-01-01

## 2019-04-30 NOTE — PROGRESS NOTES
Health Maintenance Due Topic Date Due  
 Hepatitis C Screening  1950  COLONOSCOPY  02/25/1970  Shingrix Vaccine Age 50> (1 of 2) 02/25/2002  BREAST CANCER SCRN MAMMOGRAM  02/25/2002  DTaP/Tdap/Td series (1 - Tdap) 02/03/2013  GLAUCOMA SCREENING Q2Y  02/25/2017  Bone Densitometry (Dexa) Screening  02/25/2017 Chief Complaint Patient presents with  Drug Screen f/u per MD  
 Abnormal Lab Results 1. Have you been to the ER, urgent care clinic since your last visit? Hospitalized since your last visit? No 
 
2. Have you seen or consulted any other health care providers outside of the [address] [place] [address] since your last visit? Include any pap smears or colon screening. No 
 
3) Do you have an Advance Directive on file? no 
 
4) Are you interested in receiving information on Advance Directives? NO Patient is accompanied by self I have received verbal consent from Erin Menendez to discuss any/all medical information while they are present in the room.

## 2019-05-29 NOTE — PROGRESS NOTES
HISTORY OF PRESENT ILLNESS Bunny Johnson is a 81 y.o. female. Pt. comes in for f/u. Has multiple medical problems. Reports continued fatigue. She is chronically underweight. Appetite has been poor and she is lost more weight. She had a recent syncopal episode. Denies any recurrence. Continues to have chronic neck and back pain. Reports compliance with medications and diet. Med list and most recent labs/studies reviewed with pt. AST and MCV were elevated otherwise all labs are stable. She has a long history of alcoholism although denies drinking any for the past few months. Denies smoking. She has chronic anxiety and depression. Effexor is helping but wants me to increase dosage. Trying to be active physically as tolerated. Reports no other new c/o. HPI Review of Systems Constitutional: Positive for malaise/fatigue and weight loss. HENT: Negative. Eyes: Negative. Respiratory: Negative for shortness of breath. Cardiovascular: Negative for chest pain and leg swelling. Gastrointestinal: Negative for abdominal pain. Genitourinary: Negative for dysuria. Musculoskeletal: Positive for back pain, falls, joint pain and neck pain. Skin: Negative. Neurological: Negative for dizziness, tingling, sensory change, speech change, focal weakness, seizures, loss of consciousness and headaches. Psychiatric/Behavioral: Positive for depression. The patient is nervous/anxious. The patient does not have insomnia. All other systems reviewed and are negative. Physical Exam  
Constitutional: She is oriented to person, place, and time. She appears well-developed and well-nourished. No distress. Pleasant thin/underweight lady HENT:  
Head: Normocephalic and atraumatic. Mouth/Throat: Oropharynx is clear and moist.  
Eyes: Conjunctivae are normal. No scleral icterus. Neck: Normal range of motion. Neck supple. No JVD present. No thyromegaly present. Cardiovascular: Normal rate, regular rhythm, normal heart sounds and intact distal pulses. No murmur heard. Pulmonary/Chest: Effort normal and breath sounds normal. No respiratory distress. She has no wheezes. She has no rales. Abdominal: Soft. Bowel sounds are normal. She exhibits no distension. There is no tenderness. Musculoskeletal: She exhibits tenderness (cervicls and lumbars, chronic). She exhibits no edema. djd Neurological: She is alert and oriented to person, place, and time. No cranial nerve deficit. She exhibits normal muscle tone. Coordination normal.  
Skin: Skin is warm and dry. No rash noted. Psychiatric: Her behavior is normal.  
Chronically depressed/anxious Nursing note and vitals reviewed. ASSESSMENT and PLAN Diagnoses and all orders for this visit: 
 
1. Fatigue, unspecified type 2. Weight loss -     VITAMIN B12 
-     FOLATE 
-     VITAMIN D, 25 HYDROXY 3. Elevated MCV 
-     VITAMIN B12 
-     FOLATE 
-     VITAMIN D, 25 HYDROXY 
-     HEPATITIS C AB 
-     HEPATITIS BE AG 
-     US ABD LTD; Future 4. Essential hypertension 5. Recurrent major depressive disorder, in partial remission ([facility] [address] 6. LFT elevation 
-     VITAMIN B12 
-     FOLATE 
-     VITAMIN D, 25 HYDROXY 
-     HEPATITIS C AB 
-     HEPATITIS BE AG 
-     US ABD LTD; Future 
-     ETHYL ALCOHOL 7. Vitamin D deficiency 
-     VITAMIN D, 25 HYDROXY Other orders 
-     venlafaxine-SR (EFFEXOR-XR) 150 mg capsule; Take 1 Cap by mouth daily. Follow-up and Dispositions · Return in about 1 month (around 5/28/2019). lab results and schedule of future lab studies reviewed with patient 
reviewed diet, exercise and weight control 
reviewed medications and side effects in detail F/u with other MD's as scheduled Fall precautions discussed Reassurance that all labs are stable

## 2019-06-21 NOTE — PROGRESS NOTES
Has some gallstones but no other significant abnormality  I doubt gallstones are major cause for elevation of the liver numbers  More likely to be related to alcohol  If symptoms are not better will need to see a general surgeon

## 2019-06-26 NOTE — PROGRESS NOTES
Went to call pt again with results and noted that pt is now . Per message received by Myrl Kussmaul, , pt passed on 2019.

## 2020-02-07 NOTE — PROGRESS NOTES
Called pt and LM on home number VM to please return my call but was unable to reach or LM on cell VM as it's not yet been set up. FAMILY HISTORY:  Family history of sickle cell trait, ~ presumed in parents & 7 siblings (none suffer w/ the disease, per patient)  FH: hypertension, ~ mother